# Patient Record
Sex: FEMALE | Race: WHITE | Employment: OTHER | URBAN - METROPOLITAN AREA
[De-identification: names, ages, dates, MRNs, and addresses within clinical notes are randomized per-mention and may not be internally consistent; named-entity substitution may affect disease eponyms.]

---

## 2017-01-16 ENCOUNTER — HOSPITAL ENCOUNTER (OUTPATIENT)
Dept: OTHER | Age: 72
Discharge: HOME | End: 2017-01-16
Attending: INTERNAL MEDICINE | Admitting: INTERNAL MEDICINE

## 2017-01-16 DIAGNOSIS — M70.72 BURSITIS OF HIP, LEFT: ICD-10-CM

## 2018-12-08 ENCOUNTER — HOSPITAL ENCOUNTER (EMERGENCY)
Age: 73
Discharge: HOME | End: 2018-12-08
Attending: EMERGENCY MEDICINE
Payer: MEDICARE

## 2018-12-08 ENCOUNTER — APPOINTMENT (OUTPATIENT)
Dept: GENERAL RADIOLOGY | Age: 73
End: 2018-12-08
Payer: MEDICARE

## 2018-12-08 VITALS
RESPIRATION RATE: 19 BRPM | HEIGHT: 66 IN | WEIGHT: 165 LBS | HEART RATE: 51 BPM | BODY MASS INDEX: 26.52 KG/M2 | OXYGEN SATURATION: 96 % | SYSTOLIC BLOOD PRESSURE: 154 MMHG | TEMPERATURE: 98.4 F | DIASTOLIC BLOOD PRESSURE: 93 MMHG

## 2018-12-08 DIAGNOSIS — M62.838 SPASM OF MUSCLE: Primary | ICD-10-CM

## 2018-12-08 PROCEDURE — 73030 X-RAY EXAM OF SHOULDER: CPT

## 2018-12-08 PROCEDURE — 96372 THER/PROPH/DIAG INJ SC/IM: CPT

## 2018-12-08 PROCEDURE — 6370000000 HC RX 637 (ALT 250 FOR IP): Performed by: EMERGENCY MEDICINE

## 2018-12-08 PROCEDURE — 71046 X-RAY EXAM CHEST 2 VIEWS: CPT

## 2018-12-08 PROCEDURE — 99283 EMERGENCY DEPT VISIT LOW MDM: CPT

## 2018-12-08 PROCEDURE — 6360000002 HC RX W HCPCS: Performed by: NURSE PRACTITIONER

## 2018-12-08 RX ORDER — CYCLOBENZAPRINE HCL 10 MG
10 TABLET ORAL NIGHTLY PRN
Qty: 30 TABLET | Refills: 0 | Status: SHIPPED | OUTPATIENT
Start: 2018-12-08 | End: 2018-12-18

## 2018-12-08 RX ORDER — KETOROLAC TROMETHAMINE 30 MG/ML
15 INJECTION, SOLUTION INTRAMUSCULAR; INTRAVENOUS ONCE
Status: COMPLETED | OUTPATIENT
Start: 2018-12-08 | End: 2018-12-08

## 2018-12-08 RX ORDER — HYDROCODONE BITARTRATE AND ACETAMINOPHEN 5; 325 MG/1; MG/1
1 TABLET ORAL ONCE
Status: COMPLETED | OUTPATIENT
Start: 2018-12-08 | End: 2018-12-08

## 2018-12-08 RX ORDER — CYCLOBENZAPRINE HCL 10 MG
10 TABLET ORAL ONCE
Status: COMPLETED | OUTPATIENT
Start: 2018-12-08 | End: 2018-12-08

## 2018-12-08 RX ADMIN — KETOROLAC TROMETHAMINE 15 MG: 30 INJECTION, SOLUTION INTRAMUSCULAR at 19:56

## 2018-12-08 RX ADMIN — CYCLOBENZAPRINE HYDROCHLORIDE 10 MG: 10 TABLET, FILM COATED ORAL at 20:59

## 2018-12-08 RX ADMIN — HYDROCODONE BITARTRATE AND ACETAMINOPHEN 1 TABLET: 5; 325 TABLET ORAL at 22:09

## 2018-12-08 ASSESSMENT — PAIN SCALES - GENERAL
PAINLEVEL_OUTOF10: 8
PAINLEVEL_OUTOF10: 8

## 2018-12-08 ASSESSMENT — PAIN DESCRIPTION - DESCRIPTORS
DESCRIPTORS: ACHING;CONSTANT
DESCRIPTORS: ACHING

## 2018-12-08 ASSESSMENT — PAIN DESCRIPTION - PAIN TYPE
TYPE: ACUTE PAIN;CHRONIC PAIN
TYPE: ACUTE PAIN;CHRONIC PAIN

## 2018-12-08 ASSESSMENT — PAIN DESCRIPTION - LOCATION
LOCATION: ARM;NECK
LOCATION: ARM;NECK

## 2018-12-08 ASSESSMENT — PAIN DESCRIPTION - ORIENTATION
ORIENTATION: LEFT
ORIENTATION: LEFT

## 2018-12-09 NOTE — ED NOTES
PT given Hydrocodone before going to help get the edge off of her pain per daughter's request. Pt taken via wheelchair to car. Pt safely in car with all belongings. Pt and pt's daughter have no further questions or concerns at this time. Pt in no signs of distress. Dc instructions verbalized understanding.       Raynelle Paget, RN  12/08/18 5146

## 2018-12-09 NOTE — ED NOTES
Checked to see how pt is doing, pt's daughter stated she is worried about her mom's BP at 290 and that her mom is still in pain. MD aware and will go talk with pt and pt's mom.       Maeve Malave RN  12/08/18 1095

## 2019-01-18 ENCOUNTER — HOSPITAL ENCOUNTER (OUTPATIENT)
Dept: GENERAL RADIOLOGY | Age: 74
Discharge: HOME | End: 2019-01-18
Payer: MEDICARE

## 2019-01-18 ENCOUNTER — HOSPITAL ENCOUNTER (OUTPATIENT)
Dept: MRI IMAGING | Age: 74
Discharge: HOME | End: 2019-01-18
Payer: MEDICARE

## 2019-01-18 DIAGNOSIS — M54.12 CERVICAL RADICULOPATHY: ICD-10-CM

## 2019-01-18 PROCEDURE — 72141 MRI NECK SPINE W/O DYE: CPT

## 2019-01-18 PROCEDURE — 72050 X-RAY EXAM NECK SPINE 4/5VWS: CPT

## 2019-04-01 ENCOUNTER — HOSPITAL ENCOUNTER (OUTPATIENT)
Age: 74
Discharge: HOME | End: 2019-04-01
Payer: MEDICARE

## 2019-04-01 ENCOUNTER — HOSPITAL ENCOUNTER (OUTPATIENT)
Dept: GENERAL RADIOLOGY | Age: 74
Discharge: HOME | End: 2019-04-01
Payer: MEDICARE

## 2019-04-01 DIAGNOSIS — R52 PAIN: ICD-10-CM

## 2019-04-01 PROCEDURE — 73502 X-RAY EXAM HIP UNI 2-3 VIEWS: CPT

## 2020-07-24 ENCOUNTER — APPOINTMENT (OUTPATIENT)
Dept: GENERAL RADIOLOGY | Age: 75
End: 2020-07-24
Payer: MEDICARE

## 2020-07-24 ENCOUNTER — HOSPITAL ENCOUNTER (EMERGENCY)
Age: 75
Discharge: HOME | End: 2020-07-25
Attending: EMERGENCY MEDICINE
Payer: MEDICARE

## 2020-07-24 VITALS
SYSTOLIC BLOOD PRESSURE: 177 MMHG | BODY MASS INDEX: 26.63 KG/M2 | TEMPERATURE: 97.7 F | OXYGEN SATURATION: 95 % | HEART RATE: 63 BPM | WEIGHT: 165 LBS | DIASTOLIC BLOOD PRESSURE: 85 MMHG | RESPIRATION RATE: 18 BRPM

## 2020-07-24 PROCEDURE — 6370000000 HC RX 637 (ALT 250 FOR IP): Performed by: PHYSICIAN ASSISTANT

## 2020-07-24 PROCEDURE — 73030 X-RAY EXAM OF SHOULDER: CPT

## 2020-07-24 PROCEDURE — 99283 EMERGENCY DEPT VISIT LOW MDM: CPT

## 2020-07-24 RX ORDER — HYDROCODONE BITARTRATE AND ACETAMINOPHEN 5; 325 MG/1; MG/1
1 TABLET ORAL ONCE
Status: COMPLETED | OUTPATIENT
Start: 2020-07-24 | End: 2020-07-24

## 2020-07-24 RX ORDER — HYDROCODONE BITARTRATE AND ACETAMINOPHEN 5; 325 MG/1; MG/1
1 TABLET ORAL EVERY 6 HOURS PRN
Qty: 10 TABLET | Refills: 0 | Status: SHIPPED | OUTPATIENT
Start: 2020-07-24 | End: 2020-07-27

## 2020-07-24 RX ADMIN — HYDROCODONE BITARTRATE AND ACETAMINOPHEN 1 TABLET: 5; 325 TABLET ORAL at 23:02

## 2020-07-24 ASSESSMENT — PAIN SCALES - GENERAL
PAINLEVEL_OUTOF10: 10
PAINLEVEL_OUTOF10: 10

## 2020-07-24 ASSESSMENT — PAIN DESCRIPTION - ORIENTATION: ORIENTATION: LEFT

## 2020-07-24 ASSESSMENT — PAIN DESCRIPTION - LOCATION: LOCATION: SHOULDER

## 2020-07-24 ASSESSMENT — PAIN DESCRIPTION - PAIN TYPE: TYPE: ACUTE PAIN;CHRONIC PAIN

## 2020-07-24 ASSESSMENT — PAIN DESCRIPTION - DESCRIPTORS: DESCRIPTORS: ACHING

## 2020-07-24 ASSESSMENT — PAIN DESCRIPTION - FREQUENCY: FREQUENCY: CONTINUOUS

## 2020-07-25 PROCEDURE — 6370000000 HC RX 637 (ALT 250 FOR IP): Performed by: PHYSICIAN ASSISTANT

## 2020-07-25 RX ORDER — LIDOCAINE 4 G/G
1 PATCH TOPICAL ONCE
Status: DISCONTINUED | OUTPATIENT
Start: 2020-07-25 | End: 2020-07-25 | Stop reason: HOSPADM

## 2020-07-25 RX ORDER — HYDROCODONE BITARTRATE AND ACETAMINOPHEN 5; 325 MG/1; MG/1
1 TABLET ORAL ONCE
Status: COMPLETED | OUTPATIENT
Start: 2020-07-25 | End: 2020-07-25

## 2020-07-25 RX ADMIN — HYDROCODONE BITARTRATE AND ACETAMINOPHEN 1 TABLET: 5; 325 TABLET ORAL at 00:20

## 2020-07-25 ASSESSMENT — ENCOUNTER SYMPTOMS
RHINORRHEA: 0
NAUSEA: 0
SORE THROAT: 0
VOMITING: 0
COLOR CHANGE: 0
ABDOMINAL PAIN: 0
SHORTNESS OF BREATH: 0
COUGH: 0

## 2020-07-25 NOTE — ED PROVIDER NOTES
gastritis, DVT (deep venous thrombosis) ([facility] [address] Hearing impairment, History of degenerative disc disease, HLD (hyperlipidemia), Hypothyroid, Peripheral neuropathy, and Pulmonary embolism ([facility] [address] She has no past surgical history on file. Her family history is not on file. She reports that she has never smoked. She has never used smokeless tobacco. She reports that she does not drink alcohol or use drugs. Medications     Previous Medications    CALCIUM CITRATE-VITAMIN D 200-200 MG-UNIT TABS    Take 1 tablet by mouth 2 times daily. CITALOPRAM (CELEXA) 20 MG TABLET    Take 20 mg by mouth daily. GABAPENTIN (NEURONTIN) 300 MG CAPSULE    Take 300 mg by mouth 3 times daily. GLUCOSAMINE-CHONDROITIN 500-400 MG CAPS    Take 1 capsule by mouth 2 times daily     HYDROCHLOROTHIAZIDE (MICROZIDE) 12.5 MG CAPSULE    Take 12.5 mg by mouth every morning    LEVOTHYROXINE (SYNTHROID) 50 MCG TABLET    TAKE ONE TABLET BY MOUTH ONE-HALF HOUR BEFORE BREAKFAST WITH A FULL GLASS OF WATER    MULTIPLE VITAMIN (MULTIVITAMIN) CAPSULE    Take 1 capsule by mouth daily. PRAVACHOL 10 MG TABLET    TAKE ONE TABLET BY MOUTH EVERY DAY    WARFARIN (COUMADIN) 5 MG TABLET    Take 7.5 mg by mouth daily        Allergies     She is allergic to penicillins and heparin. Physical Exam     INITIAL VITALS: BP: (!) 177/85, Temp: 97.7 °F (36.5 °C), Pulse: 63, Resp: 18, SpO2: 95 %  Physical Exam  Vitals signs reviewed. Constitutional:       General: She is not in acute distress. Appearance: Normal appearance. She is well-developed and normal weight. She is not ill-appearing. HENT:      Head: Normocephalic and atraumatic. Mouth/Throat:      Mouth: Mucous membranes are moist.   Eyes:      Extraocular Movements: Extraocular movements intact. Conjunctiva/sclera: Conjunctivae normal.   Neck:      Musculoskeletal: Normal range of motion and neck supple.       Trachea: Phonation normal.   Cardiovascular:      Rate and Rhythm: Normal due to persistent pain. Her daughter accompanies her and will be staying with her tonight. X-ray of the left shoulder shows no acute osseous injury or dislocation. The patient was reassured. She may have suffered a rotator cuff injury. She is placed in a sling and instructed on pendulum exercises. A lidocaine patch was applied and she is given a small prescription for Norco to take as directed for severe pain. She will follow-up with Dr. Tito Fenton as scheduled in 3 days for additional management or return to the ED for any worsening symptoms. This patient was also evaluated by the attending physician. All care plans were discussed and agreed upon. Clinical Impression     1. Acute pain of left shoulder        Disposition     PATIENT REFERRED TO:  Deni Hawkins MD  [address] [address]     In 3 days  as scheduled      DISCHARGE MEDICATIONS:  Discharge Medication List as of 7/24/2020 11:48 PM      START taking these medications    Details   HYDROcodone-acetaminophen (NORCO) 5-325 MG per tablet Take 1 tablet by mouth every 6 hours as needed for Pain for up to 3 days. Do not drive or operate machinery with this. Do not take additional Tylenol with this. May cause constipation. , Disp-10 tablet,R-0Print             DISPOSITION  Discharged     [place], [place]  07/25/20 5593

## 2020-07-25 NOTE — ED NOTES
Pt from home with daughter with c/o increased left shoulder pain. Hx of left shoulder issues that she receives injections for at [address] [place] [address]. Had a fall at the begining of July, no recent injury, but daughter is worried that she may have re injured the shoulder. No obvious deformity noted. Pt able to move arm but with difficulty due to pain. Has not taken anything for pain today.       Mario Avila, RN  07/24/20 [id number] [address], RN  07/24/20 9962

## 2021-01-31 ENCOUNTER — APPOINTMENT (OUTPATIENT)
Dept: GENERAL RADIOLOGY | Age: 76
End: 2021-01-31
Payer: COMMERCIAL

## 2021-01-31 ENCOUNTER — HOSPITAL ENCOUNTER (EMERGENCY)
Age: 76
Discharge: HOME | End: 2021-01-31
Attending: EMERGENCY MEDICINE
Payer: COMMERCIAL

## 2021-01-31 VITALS
RESPIRATION RATE: 20 BRPM | SYSTOLIC BLOOD PRESSURE: 183 MMHG | DIASTOLIC BLOOD PRESSURE: 85 MMHG | OXYGEN SATURATION: 91 % | TEMPERATURE: 99 F | HEART RATE: 86 BPM

## 2021-01-31 DIAGNOSIS — S62.661A CLOSED NONDISPLACED FRACTURE OF DISTAL PHALANX OF LEFT INDEX FINGER, INITIAL ENCOUNTER: ICD-10-CM

## 2021-01-31 DIAGNOSIS — S61.211A LACERATION OF LEFT INDEX FINGER WITHOUT FOREIGN BODY WITHOUT DAMAGE TO NAIL, INITIAL ENCOUNTER: Primary | ICD-10-CM

## 2021-01-31 PROCEDURE — 12001 RPR S/N/AX/GEN/TRNK 2.5CM/<: CPT

## 2021-01-31 PROCEDURE — 90715 TDAP VACCINE 7 YRS/> IM: CPT | Performed by: PHYSICIAN ASSISTANT

## 2021-01-31 PROCEDURE — 6360000002 HC RX W HCPCS: Performed by: PHYSICIAN ASSISTANT

## 2021-01-31 PROCEDURE — 73140 X-RAY EXAM OF FINGER(S): CPT

## 2021-01-31 PROCEDURE — 99285 EMERGENCY DEPT VISIT HI MDM: CPT

## 2021-01-31 PROCEDURE — 2500000003 HC RX 250 WO HCPCS: Performed by: PHYSICIAN ASSISTANT

## 2021-01-31 PROCEDURE — 6370000000 HC RX 637 (ALT 250 FOR IP): Performed by: PHYSICIAN ASSISTANT

## 2021-01-31 PROCEDURE — 90471 IMMUNIZATION ADMIN: CPT | Performed by: PHYSICIAN ASSISTANT

## 2021-01-31 RX ORDER — CEPHALEXIN 500 MG/1
500 CAPSULE ORAL 2 TIMES DAILY
Qty: 10 CAPSULE | Refills: 0 | Status: SHIPPED | OUTPATIENT
Start: 2021-01-31 | End: 2021-02-05

## 2021-01-31 RX ORDER — CEPHALEXIN 500 MG/1
500 CAPSULE ORAL ONCE
Status: COMPLETED | OUTPATIENT
Start: 2021-01-31 | End: 2021-01-31

## 2021-01-31 RX ORDER — TRAMADOL HYDROCHLORIDE 50 MG/1
50 TABLET ORAL EVERY 8 HOURS PRN
Qty: 6 TABLET | Refills: 0 | Status: SHIPPED | OUTPATIENT
Start: 2021-01-31 | End: 2021-02-03

## 2021-01-31 RX ORDER — BACITRACIN ZINC AND POLYMYXIN B SULFATE 500; 1000 [USP'U]/G; [USP'U]/G
OINTMENT TOPICAL ONCE
Status: COMPLETED | OUTPATIENT
Start: 2021-01-31 | End: 2021-01-31

## 2021-01-31 RX ORDER — LIDOCAINE HYDROCHLORIDE 20 MG/ML
5 INJECTION, SOLUTION INFILTRATION; PERINEURAL ONCE
Status: COMPLETED | OUTPATIENT
Start: 2021-01-31 | End: 2021-01-31

## 2021-01-31 RX ADMIN — CEPHALEXIN 500 MG: 500 CAPSULE ORAL at 21:06

## 2021-01-31 RX ADMIN — BACITRACIN ZINC AND POLYMYXIN B SULFATE: 500; 10000 OINTMENT TOPICAL at 21:01

## 2021-01-31 RX ADMIN — LIDOCAINE HYDROCHLORIDE 5 ML: 20 INJECTION, SOLUTION INFILTRATION; PERINEURAL at 19:08

## 2021-01-31 RX ADMIN — TETANUS TOXOID, REDUCED DIPHTHERIA TOXOID AND ACELLULAR PERTUSSIS VACCINE, ADSORBED 0.5 ML: 5; 2.5; 8; 8; 2.5 SUSPENSION INTRAMUSCULAR at 19:00

## 2021-01-31 ASSESSMENT — PAIN DESCRIPTION - LOCATION: LOCATION: FINGER (COMMENT WHICH ONE)

## 2021-01-31 ASSESSMENT — PAIN SCALES - GENERAL: PAINLEVEL_OUTOF10: 7

## 2021-01-31 ASSESSMENT — PAIN DESCRIPTION - ORIENTATION: ORIENTATION: LEFT

## 2021-01-31 ASSESSMENT — PAIN DESCRIPTION - DESCRIPTORS: DESCRIPTORS: CONSTANT;THROBBING

## 2021-01-31 NOTE — ED NOTES
Pt to ed for c/o left index finger injury. Pt finger smashed in car door.      Nicole Soria RN  01/31/21 6313

## 2021-01-31 NOTE — ED NOTES
Bed: B16-16  Expected date:   Expected time:   Means of arrival:   Comments:  Next [place], RN  01/31/21 2611

## 2021-01-31 NOTE — ED PROVIDER NOTES
[address] [facility] ENCOUNTER          PHYSICIAN ASSISTANT NOTE       Date of evaluation: 1/31/2021    Chief Complaint     Finger Injury (Left index finger injury from shutting it in a car door. Laceration and bleeding)      History of Present Illness     Shantal Moe is a 70 y.o. female with past medical history significant for hearing impairment, hyperlipidemia, hypothyroidism, PE/DVT on Coumadin who presents with complaints of left index finger injury. Patient states that her  was driving and when she went to get out of the car she was resting her left hand against the car and slammed the car door with her right hand. States that she accidentally slammed her left index finger into the car door. Patient is left-handed. Unsure of tetanus status. States she has pain only to left index finger. Denies decreased sensation or decreased range of motion, but does endorse pain with range of motion. Denies any other injury. Review of Systems     Review of Systems   See HPI, all others negative. Past Medical, Surgical, Family, and Social History     She has a past medical history of Carpal tunnel syndrome, Chronic gastritis, DVT (deep venous thrombosis) ([address] Hearing impairment, History of degenerative disc disease, HLD (hyperlipidemia), Hypothyroid, Peripheral neuropathy, and Pulmonary embolism ([facility] [address] She has no past surgical history on file. Her family history is not on file. She reports that she has never smoked. She has never used smokeless tobacco. She reports that she does not drink alcohol or use drugs. Medications     Previous Medications    CALCIUM CITRATE-VITAMIN D 200-200 MG-UNIT TABS    Take 1 tablet by mouth 2 times daily. CITALOPRAM (CELEXA) 20 MG TABLET    Take 20 mg by mouth daily. GABAPENTIN (NEURONTIN) 300 MG CAPSULE    Take 300 mg by mouth 3 times daily.      GLUCOSAMINE-CHONDROITIN 500-400 MG CAPS    Take 1 capsule by mouth 2 times daily HYDROCHLOROTHIAZIDE (MICROZIDE) 12.5 MG CAPSULE    Take 12.5 mg by mouth every morning    LEVOTHYROXINE (SYNTHROID) 50 MCG TABLET    TAKE ONE TABLET BY MOUTH ONE-HALF HOUR BEFORE BREAKFAST WITH A FULL GLASS OF WATER    MULTIPLE VITAMIN (MULTIVITAMIN) CAPSULE    Take 1 capsule by mouth daily. PRAVACHOL 10 MG TABLET    TAKE ONE TABLET BY MOUTH EVERY DAY    WARFARIN (COUMADIN) 5 MG TABLET    Take 7.5 mg by mouth daily        Allergies     She is allergic to penicillins and heparin. Physical Exam     INITIAL VITALS: BP: (!) 183/85, Temp: 99 °F (37.2 °C), Pulse: 86, Resp: 20, SpO2: 91 %  Physical Exam  Constitutional:       General: She is not in acute distress. Appearance: Normal appearance. She is not ill-appearing, toxic-appearing or diaphoretic. Comments: Hearing impaired at baseline. HENT:      Head: Normocephalic and atraumatic. Nose: Nose normal.      Mouth/Throat:      Mouth: Mucous membranes are moist.   Eyes:      Conjunctiva/sclera: Conjunctivae normal.   Neck:      Musculoskeletal: Normal range of motion. Cardiovascular:      Rate and Rhythm: Normal rate. Pulmonary:      Effort: Pulmonary effort is normal.   Abdominal:      General: There is no distension. Palpations: Abdomen is soft. Musculoskeletal:      Comments: 2 cm laceration noted to medial and dorsal aspect of left index finger with 0.5 cm laceration noted to opposite side of digit. Mild active bleeding present. Sensation intact throughout. Able to flex and extend at MCP, PIP, and DIP against resistance. Skin:     General: Skin is warm and dry. Neurological:      Mental Status: She is alert. Diagnostic Results     RADIOLOGY:  XR FINGER LEFT (MIN 2 VIEWS)   Final Result   1. Fracture the base of the distal phalanx of the second digit   2. Osteoarthritis of the proximal interphalangeal joint. LABS:   No results found for this visit on 01/31/21. RECENT VITALS:  BP: (!) 183/85, Temp: 99 °F (37.2 °C), Pulse: 86, Resp: 20, SpO2: 91 %     Procedures     Lac Repair    Date/Time: 1/31/2021 8:38 PM  Performed by: Amber Sanon PA-C  Authorized by: Audrey Gabriel MD     Consent:     Consent obtained:  Verbal    Consent given by:  Patient    Risks discussed:  Infection and pain    Alternatives discussed:  No treatment, delayed treatment, observation and referral  Anesthesia (see MAR for exact dosages): Anesthesia method:  Nerve block    Block location:  L index digital block    Block needle gauge:  25 G    Block anesthetic:  Lidocaine 2% w/o epi    Block injection procedure:  Anatomic landmarks identified, anatomic landmarks palpated, introduced needle, incremental injection and negative aspiration for blood    Block outcome:  Anesthesia achieved  Laceration details:     Location:  Finger    Finger location:  L index finger    Length (cm):  2  Repair type:     Repair type:  Simple  Pre-procedure details:     Preparation:  Patient was prepped and draped in usual sterile fashion  Treatment:     Area cleansed with:  Shur-Clens, soap and water and saline    Amount of cleaning:  Standard    Irrigation solution:  Sterile water    Irrigation method:  Syringe  Skin repair:     Repair method:  Sutures    Suture size:  5-0    Suture material:  Prolene    Suture technique:  Simple interrupted    Number of sutures:  8 (7 to larger lac, 1 to small lac on opposite side of distal portion of finger)  Approximation:     Approximation:  Close  Post-procedure details:     Dressing:  Antibiotic ointment, splint for protection and non-adherent dressing    Patient tolerance of procedure: Tolerated well, no immediate complications          ED Course     Nursing Notes, Past Medical Hx,Past Surgical Hx, Social Hx, Allergies, and Family Hx were reviewed.     The patient was given the following medications:  Orders Placed This Encounter   Medications  Tetanus-Diphth-Acell Pertussis (BOOSTRIX) injection 0.5 mL    lidocaine 2 % injection 5 mL    bacitracin-polymyxin b (POLYSPORIN) ointment    cephALEXin (KEFLEX) capsule 500 mg     Order Specific Question:   Antimicrobial Indications     Answer:   Surgical Prophylaxis    cephALEXin (KEFLEX) 500 MG capsule     Sig: Take 1 capsule by mouth 2 times daily for 5 days     Dispense:  10 capsule     Refill:  0    traMADol (ULTRAM) 50 MG tablet     Sig: Take 1 tablet by mouth every 8 hours as needed for Pain for up to 3 days. Intended supply: 3 days. Take lowest dose possible to manage pain     Dispense:  6 tablet     Refill:  0       CONSULTS:  None    MEDICAL DECISION MAKING / ASSESSMENT / PLAN     Gauri Schmid is a 70 y.o. female presenting with complaints of left index finger injury. Of note, patient states that she is left-handed, but daughter states that patient is right-handed. Patient unsure of tetanus status, so tetanus was updated today. X-ray shows:    1. Fracture the base of the distal phalanx of the second digit   2. Osteoarthritis of the proximal interphalangeal joint. Patient is neurovascularly intact to entire digit. Digital block was performed as described in detail above. Patient was then able to flex and extend at each joint against resistance. Evaluated laceration throughout full range of motion and no obvious osseous or tendon involvement, or involvement of joint space, so laceration was repaired primarily as described in detail above. Splint was placed and patient was given first dose of Keflex in the emergency department to prevent infection. Patient was counseled on wound care, when to have sutures removed, signs and symptoms of infection. Patient was given a prescription for Keflex to take twice daily for the next 5 days. Patient was also given a referral for Dr. Marychuy Le for follow-up as an outpatient. Spoke with patient's daughter regarding plan who was agreeable with this. Encouraged patient to take Tylenol for pain, patient given a short prescription for tramadol to take as needed for breakthrough pain, but discussed with patient and daughter that this causes drowsiness so she should not drive or operate machinery after taking. Patient was agreeable with this plan and was discharged in stable condition. This patient was also evaluated by the attending physician. All care plans were discussed and agreed upon. Clinical Impression     1. Laceration of left index finger without foreign body without damage to nail, initial encounter    2.  Closed nondisplaced fracture of distal phalanx of left index finger, initial encounter        Disposition     PATIENT REFERRED TO:  Margot Shepherd MD  [facility] [address] [facility]    Schedule an appointment as soon as possible for a visit         DISCHARGE MEDICATIONS:  New Prescriptions    CEPHALEXIN (KEFLEX) 500 MG CAPSULE    Take 1 capsule by mouth 2 times daily for 5 days TRAMADOL (ULTRAM) 50 MG TABLET    Take 1 tablet by mouth every 8 hours as needed for Pain for up to 3 days. Intended supply: 3 days.  Take lowest dose possible to manage pain       DISPOSITION          Danyelle Araujo PA-C  01/31/21 1582

## 2021-02-01 NOTE — ED PROVIDER NOTES
ED Attending Attestation Note     Date of evaluation: 1/31/2021    This patient was seen by the advance practice provider. I have seen and examined the patient, agree with the workup, evaluation, management and diagnosis. The care plan has been discussed. My assessment reveals a 70year old female with a past medical history of sciatica, DVT/PE on warfarin, and deaf who presents with a left 2nd digit laceration after it was inadvertently shut in a car door. She is right hand dominant. On my evaluation she has a laceration to the lateral aspect of her left 2nd digit around the DIP joint. Tetanus was updated. I was present for key portions of the laceration repair performed by the SHANTA.      Claudia Schmitz MD  01/31/21 2033

## 2021-02-14 ENCOUNTER — APPOINTMENT (OUTPATIENT)
Dept: CT IMAGING | Age: 76
End: 2021-02-14
Payer: COMMERCIAL

## 2021-02-14 ENCOUNTER — HOSPITAL ENCOUNTER (EMERGENCY)
Age: 76
Discharge: HOME | End: 2021-02-14
Attending: EMERGENCY MEDICINE
Payer: COMMERCIAL

## 2021-02-14 ENCOUNTER — APPOINTMENT (OUTPATIENT)
Dept: GENERAL RADIOLOGY | Age: 76
End: 2021-02-14
Payer: COMMERCIAL

## 2021-02-14 VITALS
DIASTOLIC BLOOD PRESSURE: 80 MMHG | HEIGHT: 64 IN | TEMPERATURE: 97.6 F | WEIGHT: 165 LBS | SYSTOLIC BLOOD PRESSURE: 170 MMHG | RESPIRATION RATE: 18 BRPM | OXYGEN SATURATION: 93 % | BODY MASS INDEX: 28.17 KG/M2 | HEART RATE: 60 BPM

## 2021-02-14 DIAGNOSIS — R10.9 ABDOMINAL PAIN, UNSPECIFIED ABDOMINAL LOCATION: Primary | ICD-10-CM

## 2021-02-14 LAB
ALBUMIN SERPL-MCNC: 3.7 G/DL (ref 3.4–5)
ALP BLD-CCNC: 97 U/L (ref 40–129)
ALT SERPL-CCNC: 15 U/L (ref 10–40)
ANION GAP SERPL CALCULATED.3IONS-SCNC: 11 MMOL/L (ref 3–16)
AST SERPL-CCNC: 24 U/L (ref 15–37)
BACTERIA: ABNORMAL /HPF
BASOPHILS ABSOLUTE: 0 K/UL (ref 0–0.2)
BASOPHILS RELATIVE PERCENT: 0.2 %
BILIRUB SERPL-MCNC: 0.4 MG/DL (ref 0–1)
BILIRUBIN DIRECT: <0.2 MG/DL (ref 0–0.3)
BILIRUBIN URINE: NEGATIVE
BILIRUBIN, INDIRECT: NORMAL MG/DL (ref 0–1)
BLOOD, URINE: NEGATIVE
BUN BLDV-MCNC: 19 MG/DL (ref 7–20)
CALCIUM SERPL-MCNC: 9 MG/DL (ref 8.3–10.6)
CHLORIDE BLD-SCNC: 101 MMOL/L (ref 99–110)
CLARITY: CLEAR
CO2: 28 MMOL/L (ref 21–32)
COLOR: YELLOW
CREAT SERPL-MCNC: 0.8 MG/DL (ref 0.6–1.2)
EOSINOPHILS ABSOLUTE: 0 K/UL (ref 0–0.6)
EOSINOPHILS RELATIVE PERCENT: 0.9 %
EPITHELIAL CELLS, UA: ABNORMAL /HPF (ref 0–5)
GFR AFRICAN AMERICAN: >60
GFR NON-AFRICAN AMERICAN: >60
GLUCOSE BLD-MCNC: 131 MG/DL (ref 70–99)
GLUCOSE URINE: NEGATIVE MG/DL
HCT VFR BLD CALC: 41 % (ref 36–48)
HEMOGLOBIN: 13.2 G/DL (ref 12–16)
KETONES, URINE: ABNORMAL MG/DL
LEUKOCYTE ESTERASE, URINE: NEGATIVE
LIPASE: 37 U/L (ref 13–60)
LYMPHOCYTES ABSOLUTE: 0.7 K/UL (ref 1–5.1)
LYMPHOCYTES RELATIVE PERCENT: 13.9 %
MCH RBC QN AUTO: 26.3 PG (ref 26–34)
MCHC RBC AUTO-ENTMCNC: 32.3 G/DL (ref 31–36)
MCV RBC AUTO: 81.6 FL (ref 80–100)
MICROSCOPIC EXAMINATION: YES
MONOCYTES ABSOLUTE: 0.4 K/UL (ref 0–1.3)
MONOCYTES RELATIVE PERCENT: 8.5 %
NEUTROPHILS ABSOLUTE: 3.7 K/UL (ref 1.7–7.7)
NEUTROPHILS RELATIVE PERCENT: 76.5 %
NITRITE, URINE: NEGATIVE
PDW BLD-RTO: 14.7 % (ref 12.4–15.4)
PH UA: 6.5 (ref 5–8)
PLATELET # BLD: 162 K/UL (ref 135–450)
PMV BLD AUTO: 9.4 FL (ref 5–10.5)
POTASSIUM REFLEX MAGNESIUM: 3.9 MMOL/L (ref 3.5–5.1)
PROTEIN UA: 30 MG/DL
RBC # BLD: 5.03 M/UL (ref 4–5.2)
RBC UA: ABNORMAL /HPF (ref 0–4)
SODIUM BLD-SCNC: 140 MMOL/L (ref 136–145)
SPECIFIC GRAVITY UA: 1.02 (ref 1–1.03)
TOTAL PROTEIN: 7.1 G/DL (ref 6.4–8.2)
URINE TYPE: ABNORMAL
UROBILINOGEN, URINE: 1 E.U./DL
WBC # BLD: 4.8 K/UL (ref 4–11)
WBC UA: ABNORMAL /HPF (ref 0–5)

## 2021-02-14 PROCEDURE — 74018 RADEX ABDOMEN 1 VIEW: CPT

## 2021-02-14 PROCEDURE — 99283 EMERGENCY DEPT VISIT LOW MDM: CPT

## 2021-02-14 PROCEDURE — U0003 INFECTIOUS AGENT DETECTION BY NUCLEIC ACID (DNA OR RNA); SEVERE ACUTE RESPIRATORY SYNDROME CORONAVIRUS 2 (SARS-COV-2) (CORONAVIRUS DISEASE [COVID-19]), AMPLIFIED PROBE TECHNIQUE, MAKING USE OF HIGH THROUGHPUT TECHNOLOGIES AS DESCRIBED BY CMS-2020-01-R: HCPCS

## 2021-02-14 PROCEDURE — 81001 URINALYSIS AUTO W/SCOPE: CPT

## 2021-02-14 PROCEDURE — 83690 ASSAY OF LIPASE: CPT

## 2021-02-14 PROCEDURE — 80076 HEPATIC FUNCTION PANEL: CPT

## 2021-02-14 PROCEDURE — 80048 BASIC METABOLIC PNL TOTAL CA: CPT

## 2021-02-14 PROCEDURE — 6360000004 HC RX CONTRAST MEDICATION: Performed by: PHYSICIAN ASSISTANT

## 2021-02-14 PROCEDURE — 74177 CT ABD & PELVIS W/CONTRAST: CPT

## 2021-02-14 PROCEDURE — 85025 COMPLETE CBC W/AUTO DIFF WBC: CPT

## 2021-02-14 RX ORDER — POLYETHYLENE GLYCOL 3350 17 G/17G
17 POWDER, FOR SOLUTION ORAL DAILY
Qty: 1 BOTTLE | Refills: 0 | Status: SHIPPED | OUTPATIENT
Start: 2021-02-14 | End: 2021-02-21

## 2021-02-14 RX ADMIN — IOPAMIDOL 80 ML: 755 INJECTION, SOLUTION INTRAVENOUS at 12:13

## 2021-02-14 ASSESSMENT — ENCOUNTER SYMPTOMS
CONSTIPATION: 1
NAUSEA: 1
VOMITING: 0
ABDOMINAL PAIN: 1
CHEST TIGHTNESS: 0
SHORTNESS OF BREATH: 0
DIARRHEA: 1
BACK PAIN: 0

## 2021-02-14 NOTE — ED PROVIDER NOTES
ED Attending Attestation Note     Date of evaluation: 2/14/2021    This patient was seen by the advance practice provider, Christiano Mcallister PA-C. I have seen and examined the patient, agree with the workup, evaluation, management and diagnosis. The care plan has been discussed. This is a 70-year-old female who is deaf that has a history of pulmonary embolism and thyroid disease that presents today for evaluation of abdominal pain. History is obtained in speaking with the , as well as speaking to her daughter via the phone. Apparently, the patient had diarrhea for the last 3 days, it is nonbloody. She did try Imodium with some improvement in her symptoms. However, her daughter states that she called her earlier this morning stating that she was weak and vomiting and needed to go to the emergency department. There is been no fevers or chills. No known COVID-19 exposures. The daughter does relate that she has a history of squamous carcinoma and she recently got that removed in dermatology earlier this week. On exam, the patient is in no acute distress. Her skin is pale. Her pupils are equal reactive to light. Lung sounds are clear but diminished. Belly soft, nontender, without rebound or guarding. Toe exam performed by PA shows no gross blood, brown stool, no hemorrhoids. Work-up in the emergency department consisted of labs, urinalysis and a KUB. Labs are reassuring, normal liver function test, no leukocytosis, normal renal function. KUB without acute findings, specifically no obstructive gas pattern. This was followed with a CT scan of the abdomen pelvis that showed a questionable pneumonia with a distended gallbladder with no gallstones. At this point, the patient had a p.o. challenge and was successful in this. We did reach out to the patient's daughter to explain findings today. We will test the patient for COVID-19, quarantine precautions were discussed.     Please see TITO note for reassessments and final disposition.        Troy Ponce MD  02/14/21 1176

## 2021-02-14 NOTE — ED PROVIDER NOTES
[facility]          PHYSICIAN ASSISTANT NOTE       Date of evaluation: 2/14/2021    Chief Complaint     Abdominal Pain, Constipation (5 days of abdominal pain, consitpation, and nausea, fatigue, and sweats), and Nausea      History of Present Illness     Ava Murrell is a 70 y.o. female who presents to the emergency department with abdominal pain, nausea and intermittent episodes of diarrhea and constipation. The patient states is been ongoing for the last week and 1/2 to 2 weeks. She states that initially started as diarrhea however after being given medication by her daughter she now feels constipated. She denies emesis but has had nausea. She states she has had a slight decrease in her appetite. Denies fevers or chills. Denies chest pain or shortness of breath. Denies dysuria, hematuria, hematochezia or hematemesis. She denies any sick contacts but does live with her . She has not seen her primary care physician for this. Review of Systems     Review of Systems   Constitutional: Negative for chills and fever. HENT: Negative. Respiratory: Negative for chest tightness and shortness of breath. Cardiovascular: Negative. Negative for chest pain. Gastrointestinal: Positive for abdominal pain, constipation, diarrhea and nausea. Negative for vomiting. Genitourinary: Negative. Negative for difficulty urinating, dysuria, flank pain, frequency and hematuria. Musculoskeletal: Negative for back pain and neck pain. Skin: Negative. Neurological: Negative. Negative for dizziness, weakness, light-headedness and headaches. Psychiatric/Behavioral: Negative. All other systems reviewed and are negative.       Past Medical, Surgical, Family, and Social History     She has a past medical history of Carpal tunnel syndrome, Chronic gastritis, DVT (deep venous thrombosis) ([facility] [address] Hearing impairment, History of degenerative disc disease, HLD no wheezing, rales or rhonchi good air movement throughout  Abdominal:      General: Bowel sounds are normal.      Palpations: Abdomen is soft. Tenderness: There is no abdominal tenderness. There is no right CVA tenderness, left CVA tenderness, guarding or rebound. Comments: Examination abdomen is soft without rebound or guarding. No tenderness on my palpation. No flank pain or CVA tenderness. No peritoneal signs. Genitourinary:     Comments: On rectal examination there is no evidence of external or internal hemorrhoids. No stool burden noted. Musculoskeletal: Normal range of motion. Skin:     General: Skin is warm and dry. Neurological:      General: No focal deficit present. Mental Status: She is alert and oriented to person, place, and time. Psychiatric:         Mood and Affect: Mood normal.         Behavior: Behavior normal.         Thought Content: Thought content normal.         Judgment: Judgment normal.         Diagnostic Results         RADIOLOGY:  CT ABDOMEN PELVIS W IV CONTRAST Additional Contrast? None   Final Result      Patchy bilateral airspace disease in the lower lungs, consistent with infiltrates. Trace right effusion. Cardiomegaly. Incidental cholelithiasis. Incidental small 1 x 0.6 cm cystic focus in the tail of the pancreas, nonspecific. Short-term follow-up recommended. No other acute findings in the abdomen or pelvis. INCIDENTAL FINDINGS      XR ABDOMEN (KUB) (SINGLE AP VIEW)   Final Result      Nonspecific abdomen.           LABS:   Results for orders placed or performed during the hospital encounter of 02/14/21   CBC auto differential   Result Value Ref Range    WBC 4.8 4.0 - 11.0 K/uL    RBC 5.03 4.00 - 5.20 M/uL    Hemoglobin 13.2 12.0 - 16.0 g/dL    Hematocrit 41.0 36.0 - 48.0 %    MCV 81.6 80.0 - 100.0 fL    MCH 26.3 26.0 - 34.0 pg    MCHC 32.3 31.0 - 36.0 g/dL    RDW 14.7 12.4 - 15.4 %    Platelets 832 891 - 090 K/uL    MPV 9.4 5.0 - 10.5 fL    Neutrophils % 76.5 %    Lymphocytes % 13.9 %    Monocytes % 8.5 %    Eosinophils % 0.9 %    Basophils % 0.2 %    Neutrophils Absolute 3.7 1.7 - 7.7 K/uL    Lymphocytes Absolute 0.7 (L) 1.0 - 5.1 K/uL    Monocytes Absolute 0.4 0.0 - 1.3 K/uL    Eosinophils Absolute 0.0 0.0 - 0.6 K/uL    Basophils Absolute 0.0 0.0 - 0.2 K/uL   Basic Metabolic Panel w/ Reflex to MG   Result Value Ref Range    Sodium 140 136 - 145 mmol/L    Potassium reflex Magnesium 3.9 3.5 - 5.1 mmol/L    Chloride 101 99 - 110 mmol/L    CO2 28 21 - 32 mmol/L    Anion Gap 11 3 - 16    Glucose 131 (H) 70 - 99 mg/dL    BUN 19 7 - 20 mg/dL    CREATININE 0.8 0.6 - 1.2 mg/dL    GFR Non-African American >60 >60    GFR African American >60 >60    Calcium 9.0 8.3 - 10.6 mg/dL   Lipase   Result Value Ref Range    Lipase 37.0 13.0 - 60.0 U/L   Hepatic function panel (LFTs)   Result Value Ref Range    Total Protein 7.1 6.4 - 8.2 g/dL    Albumin 3.7 3.4 - 5.0 g/dL    Alkaline Phosphatase 97 40 - 129 U/L    ALT 15 10 - 40 U/L    AST 24 15 - 37 U/L    Total Bilirubin 0.4 0.0 - 1.0 mg/dL    Bilirubin, Direct <0.2 0.0 - 0.3 mg/dL    Bilirubin, Indirect see below 0.0 - 1.0 mg/dL   Urinalysis   Result Value Ref Range    Color, UA Yellow Straw/Yellow    Clarity, UA Clear Clear    Glucose, Ur Negative Negative mg/dL    Bilirubin Urine Negative Negative    Ketones, Urine TRACE (A) Negative mg/dL    Specific Gravity, UA 1.020 1.005 - 1.030    Blood, Urine Negative Negative    pH, UA 6.5 5.0 - 8.0    Protein, UA 30 (A) Negative mg/dL    Urobilinogen, Urine 1.0 <2.0 E.U./dL    Nitrite, Urine Negative Negative    Leukocyte Esterase, Urine Negative Negative    Microscopic Examination YES     Urine Type Voided    Microscopic Urinalysis   Result Value Ref Range    WBC, UA None seen 0 - 5 /HPF    RBC, UA None seen 0 - 4 /HPF    Epithelial Cells, UA 0-1 0 - 5 /HPF    Bacteria, UA Rare (A) None Seen /HPF           RECENT VITALS:  BP: (!) 171/79, Temp: 97.6 °F (36.4 °C), Pulse: 63, Resp: 16, SpO2: 90 %     Procedures         ED Course     Nursing Notes, Past Medical Hx,Past Surgical Hx, Social Hx, Allergies, and Family Hx were reviewed. The patient was given the following medications:  Orders Placed This Encounter   Medications    iopamidol (ISOVUE-370) 76 % injection 80 mL    polyethylene glycol (MIRALAX) 17 GM/SCOOP powder     Sig: Take 17 g by mouth daily for 7 days PRN constipation     Dispense:  1 Bottle     Refill:  0       CONSULTS:  None    MEDICAL DECISION MAKING / ASSESSMENT / PLAN     Starr Gagnon is a 70 y.o. female who presented to the emergency department with abdominal pain, nausea. On examination her abdomen is soft without peritoneal signs. She had IV established with labs drawn. Labs are unremarkable. No evidence of dehydration. KUB shows no findings concerning for obstruction no large stool burden. Given the patient's pain I did order a CT of the abdomen and pelvis with IV contrast.  CT shows cholelithiasis with no evidence of cholecystitis. She has patchy bilateral airspace disease in the lower lungs consistent with possible infiltrates and a right trace effusion. Incidentally she has a small cystic focus in the tail of the pancreas nonspecific. The result was discussed with the patient and she will follow-up with her primary care physician. I spoke with the patient and she denies cough, congestion, chest pain or shortness of breath therefore my suspicion that this is a bacterial pneumonia is low in this patient. Her daughter was concerned that she may have Covid and this test has been ordered and is pending. Given that the patient is not hypoxic, has an unremarkable work-up here I do feel she can be discharged.   I discussed with both the patient and the daughter that we would not prescribe antibiotics given the patient is asymptomatic however I encouraged that she follow-up with her primary care physician this week for reevaluation and should she develop findings concerning for pneumonia she may warrant antibiotics at that time. They both were in agreement to this plan. At this time she will be discharged home. She is to return immediately for worsening symptoms or concerns as discussed. This patient was also evaluated by the attending physician. All care plans were discussed and agreed upon. Clinical Impression     1.  Abdominal pain, unspecified abdominal location        Disposition     PATIENT REFERRED TO:  Laura Perdue MD  [facility], [address] [address] [facility] [address] [phone]    Call   for follow up and re-evaluation      DISCHARGE MEDICATIONS:  New Prescriptions    POLYETHYLENE GLYCOL (MIRALAX) 17 GM/SCOOP POWDER    Take 17 g by mouth daily for 7 days PRN constipation       DISPOSITION Decision To Discharge 02/14/2021 02:12:02 PM      ROSIBEL Gill  02/14/21 9036

## 2021-02-15 ENCOUNTER — CARE COORDINATION (OUTPATIENT)
Dept: CARE COORDINATION | Age: 76
End: 2021-02-15

## 2021-02-15 LAB — SARS-COV-2, PCR: DETECTED

## 2021-02-15 NOTE — CARE COORDINATION
Patient contacted regarding recent visit for viral symptoms. This Francheska Mosqueda contacted the family by telephone to perform post discharge call. Call within 2 business days of discharge: Yes    Left HIPAA compliant message for POA/Daughter, Ray Mills to return call. If no return call, will attempt outreach again.

## 2021-02-16 NOTE — CARE COORDINATION
Patient contacted regarding recent visit for viral symptoms. This Wale Murillo contacted the family by telephone to perform post discharge call. Verified name and  with family as identifiers. Provided introduction to self, and reason for call due to viral symptoms of infection and/or exposure to COVID-19. Call within 2 business days of discharge: Yes    Spoke to daughter/POADaniele who reports that symptoms have improved some. Daughter reports that new RX has been filled and has been taking. Daughter reports that pt frequently follows up with PCP, Dr. Tom Boone for INR management and will be following up with PCP soon. Daughter declined [place]. Daughter declined Loop enrollment and follow up call next week for symptom recheck, but did thank author for calling to follow up. Will remove self from care team.      Patient presented to emergency department/flu clinic with complaints of viral symptoms/exposure to COVID. Patient reports symptoms are improving. Due to no new or worsening symptoms the RN CTN/ACM was not notified for escalation. Discussed exposure protocols and quarantine with CDC Guidelines What To Do If You Are Sick    Family was given an opportunity for questions and concerns. Stay home except to get medical care    Separate yourself from other people and animals in your home    Call ahead before visiting your doctor    Wear a facemask    Cover your coughs and sneezes    Clean your hands often    Avoid sharing personal household items    Clean all high-touch surfaces everyday    Monitor your symptoms  Seek prompt medical attention if your illness is worsening (e.g., difficulty breathing). Before seeking care, call your healthcare provider and tell them that you have, or are being evaluated for, COVID-19. Put on a facemask before you enter the facility.  These steps will help the healthcare provider's office to keep other people in the office or waiting room from getting infected or exposed. Ask your healthcare provider to call the local or state health department. Persons who are placed under If you have a medical emergency and need to call 911, notify the dispatch personnel that you have, or are being evaluated for COVID-19. If possible, put on a facemask before emergency medical services arrive. The family agrees to contact the [employer] exposure line [phone], local health department [address]: ([phone]) and PCP office for questions related to their healthcare. Author provided contact information for future reference. Patient/family/caregiver given information for [employer] and agrees to enroll no, declined  Patient's preferred e-mail:    Patient's preferred phone number:   Based on Loop alert triggers, patient will be contacted by nurse care manager for worsening symptoms.

## 2021-06-26 ENCOUNTER — APPOINTMENT (OUTPATIENT)
Dept: GENERAL RADIOLOGY | Age: 76
End: 2021-06-26
Payer: COMMERCIAL

## 2021-06-26 ENCOUNTER — APPOINTMENT (OUTPATIENT)
Dept: CT IMAGING | Age: 76
End: 2021-06-26
Payer: COMMERCIAL

## 2021-06-26 ENCOUNTER — HOSPITAL ENCOUNTER (EMERGENCY)
Age: 76
Discharge: HOME | End: 2021-06-26
Attending: EMERGENCY MEDICINE
Payer: COMMERCIAL

## 2021-06-26 VITALS
TEMPERATURE: 98.7 F | WEIGHT: 180 LBS | RESPIRATION RATE: 18 BRPM | HEIGHT: 62 IN | SYSTOLIC BLOOD PRESSURE: 164 MMHG | OXYGEN SATURATION: 94 % | HEART RATE: 66 BPM | BODY MASS INDEX: 33.13 KG/M2 | DIASTOLIC BLOOD PRESSURE: 88 MMHG

## 2021-06-26 DIAGNOSIS — M54.6 ACUTE RIGHT-SIDED THORACIC BACK PAIN: Primary | ICD-10-CM

## 2021-06-26 LAB
ALBUMIN SERPL-MCNC: 4.2 G/DL (ref 3.4–5)
ALP BLD-CCNC: 67 U/L (ref 40–129)
ALT SERPL-CCNC: 17 U/L (ref 10–40)
ANION GAP SERPL CALCULATED.3IONS-SCNC: 10 MMOL/L (ref 3–16)
AST SERPL-CCNC: 25 U/L (ref 15–37)
BASOPHILS ABSOLUTE: 0 K/UL (ref 0–0.2)
BASOPHILS RELATIVE PERCENT: 0.7 %
BILIRUB SERPL-MCNC: 0.3 MG/DL (ref 0–1)
BILIRUBIN DIRECT: <0.2 MG/DL (ref 0–0.3)
BILIRUBIN URINE: NEGATIVE
BILIRUBIN, INDIRECT: NORMAL MG/DL (ref 0–1)
BLOOD, URINE: NEGATIVE
BUN BLDV-MCNC: 26 MG/DL (ref 7–20)
CALCIUM SERPL-MCNC: 9.5 MG/DL (ref 8.3–10.6)
CHLORIDE BLD-SCNC: 102 MMOL/L (ref 99–110)
CLARITY: CLEAR
CO2: 30 MMOL/L (ref 21–32)
COLOR: YELLOW
CREAT SERPL-MCNC: 1 MG/DL (ref 0.6–1.2)
EOSINOPHILS ABSOLUTE: 0.2 K/UL (ref 0–0.6)
EOSINOPHILS RELATIVE PERCENT: 2.6 %
GFR AFRICAN AMERICAN: >60
GFR NON-AFRICAN AMERICAN: 54
GLUCOSE BLD-MCNC: 120 MG/DL (ref 70–99)
GLUCOSE URINE: NEGATIVE MG/DL
HCT VFR BLD CALC: 40.1 % (ref 36–48)
HEMOGLOBIN: 13.3 G/DL (ref 12–16)
KETONES, URINE: NEGATIVE MG/DL
LEUKOCYTE ESTERASE, URINE: NEGATIVE
LIPASE: 33 U/L (ref 13–60)
LYMPHOCYTES ABSOLUTE: 1.4 K/UL (ref 1–5.1)
LYMPHOCYTES RELATIVE PERCENT: 23.9 %
MCH RBC QN AUTO: 27.9 PG (ref 26–34)
MCHC RBC AUTO-ENTMCNC: 33.3 G/DL (ref 31–36)
MCV RBC AUTO: 83.9 FL (ref 80–100)
MICROSCOPIC EXAMINATION: NORMAL
MONOCYTES ABSOLUTE: 0.4 K/UL (ref 0–1.3)
MONOCYTES RELATIVE PERCENT: 7.4 %
NEUTROPHILS ABSOLUTE: 3.8 K/UL (ref 1.7–7.7)
NEUTROPHILS RELATIVE PERCENT: 65.4 %
NITRITE, URINE: NEGATIVE
PDW BLD-RTO: 13.9 % (ref 12.4–15.4)
PH UA: 5.5 (ref 5–8)
PLATELET # BLD: 119 K/UL (ref 135–450)
PMV BLD AUTO: 9.7 FL (ref 5–10.5)
POTASSIUM REFLEX MAGNESIUM: 4.2 MMOL/L (ref 3.5–5.1)
PROTEIN UA: NEGATIVE MG/DL
RBC # BLD: 4.77 M/UL (ref 4–5.2)
SODIUM BLD-SCNC: 142 MMOL/L (ref 136–145)
SPECIFIC GRAVITY UA: >=1.03 (ref 1–1.03)
TOTAL PROTEIN: 6.6 G/DL (ref 6.4–8.2)
URINE TYPE: NORMAL
UROBILINOGEN, URINE: 0.2 E.U./DL
WBC # BLD: 5.8 K/UL (ref 4–11)

## 2021-06-26 PROCEDURE — 71275 CT ANGIOGRAPHY CHEST: CPT

## 2021-06-26 PROCEDURE — 71046 X-RAY EXAM CHEST 2 VIEWS: CPT

## 2021-06-26 PROCEDURE — 72100 X-RAY EXAM L-S SPINE 2/3 VWS: CPT

## 2021-06-26 PROCEDURE — 81003 URINALYSIS AUTO W/O SCOPE: CPT

## 2021-06-26 PROCEDURE — 85025 COMPLETE CBC W/AUTO DIFF WBC: CPT

## 2021-06-26 PROCEDURE — 80048 BASIC METABOLIC PNL TOTAL CA: CPT

## 2021-06-26 PROCEDURE — 99284 EMERGENCY DEPT VISIT MOD MDM: CPT

## 2021-06-26 PROCEDURE — 72070 X-RAY EXAM THORAC SPINE 2VWS: CPT

## 2021-06-26 PROCEDURE — 83690 ASSAY OF LIPASE: CPT

## 2021-06-26 PROCEDURE — 6370000000 HC RX 637 (ALT 250 FOR IP): Performed by: PHYSICIAN ASSISTANT

## 2021-06-26 PROCEDURE — 80076 HEPATIC FUNCTION PANEL: CPT

## 2021-06-26 PROCEDURE — 6360000004 HC RX CONTRAST MEDICATION: Performed by: EMERGENCY MEDICINE

## 2021-06-26 RX ORDER — HYDROCODONE BITARTRATE AND ACETAMINOPHEN 5; 325 MG/1; MG/1
1 TABLET ORAL EVERY 4 HOURS PRN
Qty: 12 TABLET | Refills: 0 | Status: SHIPPED | OUTPATIENT
Start: 2021-06-26 | End: 2021-06-29

## 2021-06-26 RX ORDER — LIDOCAINE 4 G/G
1 PATCH TOPICAL DAILY
Status: DISCONTINUED | OUTPATIENT
Start: 2021-06-26 | End: 2021-06-26 | Stop reason: HOSPADM

## 2021-06-26 RX ORDER — LIDOCAINE 50 MG/G
1 PATCH TOPICAL DAILY
Qty: 30 PATCH | Refills: 0 | Status: SHIPPED | OUTPATIENT
Start: 2021-06-26

## 2021-06-26 RX ORDER — ACETAMINOPHEN 325 MG/1
650 TABLET ORAL ONCE
Status: COMPLETED | OUTPATIENT
Start: 2021-06-26 | End: 2021-06-26

## 2021-06-26 RX ADMIN — ACETAMINOPHEN 650 MG: 325 TABLET ORAL at 11:18

## 2021-06-26 RX ADMIN — IOPAMIDOL 80 ML: 755 INJECTION, SOLUTION INTRAVENOUS at 12:43

## 2021-06-26 ASSESSMENT — ENCOUNTER SYMPTOMS
NAUSEA: 0
COUGH: 0
ABDOMINAL PAIN: 0
VOMITING: 0
SHORTNESS OF BREATH: 0
PHOTOPHOBIA: 0
BACK PAIN: 1
DIARRHEA: 0
EYE PAIN: 0

## 2021-06-26 ASSESSMENT — PAIN DESCRIPTION - LOCATION: LOCATION: BACK

## 2021-06-26 ASSESSMENT — PAIN DESCRIPTION - ORIENTATION: ORIENTATION: RIGHT

## 2021-06-26 ASSESSMENT — PAIN SCALES - GENERAL
PAINLEVEL_OUTOF10: 8
PAINLEVEL_OUTOF10: 8

## 2021-06-26 ASSESSMENT — PAIN DESCRIPTION - PAIN TYPE: TYPE: ACUTE PAIN

## 2021-06-26 NOTE — ED NOTES
Patient prepared for and ready to be discharged. Patient discharged at this time in no acute distress after verbalizing understanding of discharge instructions. Patient left after receiving After Visit Summary instructions.       Caio George RN  06/26/21 [id number]

## 2022-05-10 ENCOUNTER — APPOINTMENT (OUTPATIENT)
Dept: GENERAL RADIOLOGY | Age: 77
End: 2022-05-10
Payer: MEDICARE

## 2022-05-10 ENCOUNTER — HOSPITAL ENCOUNTER (EMERGENCY)
Age: 77
Discharge: HOME | End: 2022-05-10
Attending: EMERGENCY MEDICINE
Payer: MEDICARE

## 2022-05-10 VITALS
SYSTOLIC BLOOD PRESSURE: 152 MMHG | OXYGEN SATURATION: 93 % | WEIGHT: 180 LBS | HEART RATE: 64 BPM | HEIGHT: 66 IN | BODY MASS INDEX: 28.93 KG/M2 | TEMPERATURE: 97.7 F | RESPIRATION RATE: 15 BRPM | DIASTOLIC BLOOD PRESSURE: 79 MMHG

## 2022-05-10 DIAGNOSIS — M79.602 LEFT ARM PAIN: Primary | ICD-10-CM

## 2022-05-10 DIAGNOSIS — M19.90 ARTHRITIS: ICD-10-CM

## 2022-05-10 PROCEDURE — 6370000000 HC RX 637 (ALT 250 FOR IP): Performed by: STUDENT IN AN ORGANIZED HEALTH CARE EDUCATION/TRAINING PROGRAM

## 2022-05-10 PROCEDURE — 73080 X-RAY EXAM OF ELBOW: CPT

## 2022-05-10 PROCEDURE — 99283 EMERGENCY DEPT VISIT LOW MDM: CPT

## 2022-05-10 PROCEDURE — 73030 X-RAY EXAM OF SHOULDER: CPT

## 2022-05-10 PROCEDURE — 73060 X-RAY EXAM OF HUMERUS: CPT

## 2022-05-10 RX ORDER — OXYCODONE HYDROCHLORIDE 5 MG/1
5 TABLET ORAL ONCE
Status: COMPLETED | OUTPATIENT
Start: 2022-05-10 | End: 2022-05-10

## 2022-05-10 RX ORDER — LIDOCAINE 4 G/G
1 PATCH TOPICAL DAILY
Status: DISCONTINUED | OUTPATIENT
Start: 2022-05-10 | End: 2022-05-10 | Stop reason: HOSPADM

## 2022-05-10 RX ORDER — ACETAMINOPHEN 500 MG
1000 TABLET ORAL ONCE
Status: COMPLETED | OUTPATIENT
Start: 2022-05-10 | End: 2022-05-10

## 2022-05-10 RX ADMIN — ACETAMINOPHEN 1000 MG: 500 TABLET ORAL at 12:02

## 2022-05-10 RX ADMIN — IBUPROFEN 600 MG: 200 TABLET, FILM COATED ORAL at 12:03

## 2022-05-10 RX ADMIN — OXYCODONE 5 MG: 5 TABLET ORAL at 12:02

## 2022-05-10 ASSESSMENT — PAIN DESCRIPTION - ORIENTATION
ORIENTATION: LEFT
ORIENTATION: LEFT

## 2022-05-10 ASSESSMENT — PAIN - FUNCTIONAL ASSESSMENT
PAIN_FUNCTIONAL_ASSESSMENT: NONE - DENIES PAIN
PAIN_FUNCTIONAL_ASSESSMENT: ACTIVITIES ARE NOT PREVENTED
PAIN_FUNCTIONAL_ASSESSMENT: 0-10

## 2022-05-10 ASSESSMENT — PAIN DESCRIPTION - LOCATION
LOCATION: ARM
LOCATION: ARM

## 2022-05-10 ASSESSMENT — PAIN DESCRIPTION - DESCRIPTORS
DESCRIPTORS: PINS AND NEEDLES
DESCRIPTORS: PINS AND NEEDLES

## 2022-05-10 ASSESSMENT — ENCOUNTER SYMPTOMS
RESPIRATORY NEGATIVE: 1
GASTROINTESTINAL NEGATIVE: 1
EYES NEGATIVE: 1

## 2022-05-10 ASSESSMENT — PAIN SCALES - GENERAL: PAINLEVEL_OUTOF10: 10

## 2022-05-10 ASSESSMENT — PAIN DESCRIPTION - ONSET: ONSET: AWAKENED FROM SLEEP

## 2022-05-10 ASSESSMENT — PAIN DESCRIPTION - PAIN TYPE: TYPE: ACUTE PAIN

## 2022-05-10 ASSESSMENT — PAIN DESCRIPTION - FREQUENCY: FREQUENCY: CONTINUOUS

## 2022-05-10 NOTE — ED PROVIDER NOTES
[address] [facility] RESIDENT NOTE     Date of Evaluation: 5/10/2022    Chief Complaint     Arm Pain (LEFT ARM FOR 1 WEEK)    History of Present Illness     Amie Henderson is a 78 y.o. 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 Patient has extensive history of osteoarthritis and treatment joint disease. She recently finished rehab following a total shoulder replacement. Starting approximately 1 week ago, she developed pain of her left elbow and humerus. She denies any precipitating trauma or strenuous activity such as lifting heavy items. She reports her pain is worse with movement, specifically flexion about her elbow. She also has some pain in her left shoulder and some tightness of her fingers when she clenches her fist.  She was previously taking oxycodone postoperatively for pain control that was now currently only taking gabapentin for pain control. She denies any fevers, chills, or other constitutional symptoms. Review of Systems     Review of Systems   Constitutional: Negative. HENT: Negative. Eyes: Negative. Respiratory: Negative. Cardiovascular: Negative. Gastrointestinal: Negative. Endocrine: Negative. Genitourinary: Negative. Musculoskeletal: Positive for arthralgias and myalgias. Negative for joint swelling, neck pain and neck stiffness. Neurological: Negative. Hematological: Negative. Past Medical, Surgical, Family, and Social History     She has a past medical history of Carpal tunnel syndrome, Chronic gastritis, DVT (deep venous thrombosis) ([address] Hearing impairment, History of degenerative disc disease, HLD (hyperlipidemia), Hypothyroid, Peripheral neuropathy, and Pulmonary embolism ([address] She has no past surgical history on file.   Her family history is not on file. She reports that she has never smoked. She has never used smokeless tobacco. She reports that she does not drink alcohol and does not use drugs. Medications     Previous Medications    CALCIUM CITRATE-VITAMIN D 200-200 MG-UNIT TABS    Take 1 tablet by mouth 2 times daily. CITALOPRAM (CELEXA) 20 MG TABLET    Take 20 mg by mouth daily. GABAPENTIN (NEURONTIN) 300 MG CAPSULE    Take 300 mg by mouth 3 times daily. GLUCOSAMINE-CHONDROITIN 500-400 MG CAPS    Take 1 capsule by mouth 2 times daily     HYDROCHLOROTHIAZIDE (MICROZIDE) 12.5 MG CAPSULE    Take 12.5 mg by mouth every morning    LEVOTHYROXINE (SYNTHROID) 50 MCG TABLET    TAKE ONE TABLET BY MOUTH ONE-HALF HOUR BEFORE BREAKFAST WITH A FULL GLASS OF WATER    LIDOCAINE (LIDODERM) 5 %    Place 1 patch onto the skin daily 12 hours on, 12 hours off. MULTIPLE VITAMIN (MULTIVITAMIN) CAPSULE    Take 1 capsule by mouth daily. PRAVACHOL 10 MG TABLET    TAKE ONE TABLET BY MOUTH EVERY DAY    WARFARIN (COUMADIN) 5 MG TABLET    Take 7.5 mg by mouth daily      Allergies     She is allergic to penicillins and heparin. Physical Exam     INITIAL VITALS:   BP: (!) 152/79, Temp: 97.7 °F (36.5 °C), Pulse: 64, Resp: 15, SpO2: 93 %     General: 78 y.o. female, well nourished; well developed; in no apparent distress. HEENT: Normocephalic, atraumatic. Eyes: Anicteric, EOMI. Neck: Supple, full ROM, trachea midline. Pulmonary: Lungs clear to auscultation bilaterally with symmetric aeration. No wheezes/rales/rhonchi. Cardiac: Regular rate and rhythm. No murmurs/rubs/gallops. Abdomen: Soft, non-tender, non-distended. No rebound or guarding. Extremities: 2+ radial pulses. Tenderness to palpation over her left elbow olecranon process along the joint line. Tenderness to palpation over the posterior aspect of her left humerus. Mild tenderness to palpation of her left shoulder. Pain worsens with elbow flexion.   Patient able to fully extend her elbow but only able to flex to approximately 60 degrees. Patient able to abduct her shoulder to 90 degrees. No obvious joint effusions. No redness or warmth overlying the joints. Distal sensation to light touch intact. Skin: No rashes or bruising. Neuro: Alert and oriented x3. Speech normal. Moves UE and LE spontaneously and symmetrically. Psych: Mood and affect appropriate for situation. DiagnosticResults     EKG:  N/A    RADIOLOGY:  XR SHOULDER LEFT (MIN 2 VIEWS)   Final Result   1. Left shoulder prosthesis: Intact   2. No fracture or dislocation      XR HUMERUS LEFT (MIN 2 VIEWS)   Final Result   1. Through the shaft of humerus is intact   2. Intact total left shoulder prosthesis      XR ELBOW LEFT (MIN 3 VIEWS)   Final Result   1. Degenerative posttraumatic osteophytic changes        LABS:  No results found for this visit on 05/10/22. ED BEDSIDE ULTRASOUND:  None    RECENT VITALS:   BP: (!) 152/79, Temp: 97.7 °F (36.5 °C), Pulse: 64,Resp: 15, SpO2: 93 %     Procedures     None    ED Course     Nursing Notes, Past Medical Hx, Past Surgical Hx, Social Hx, Allergies, and Family Hx were reviewed. PATIENT GIVEN FOLLOWING MEDICATIONS:  Orders Placed This Encounter   Medications    oxyCODONE (ROXICODONE) immediate release tablet 5 mg    acetaminophen (TYLENOL) tablet 1,000 mg    lidocaine 4 % external patch 1 patch    ibuprofen (ADVIL;MOTRIN) tablet 600 mg    diclofenac sodium (VOLTAREN) 1 % GEL     Sig: Apply 2 g topically 4 times daily Apply to affected joint. Dispense:  150 g     Refill:  0     CONSULTS:  None    MEDICAL DECISION MAKING / ASSESSMENT / PLAN     Shannon Campo is a 78 y.o. female who presents with left arm pain localized primarily to her left elbow and left humerus. Patient is well-appearing and hemodynamically stable on arrival.  Overall, her clinical presentation is most consistent with likely worsening of her existing osteoarthritis.   Bursitis also considered given pain over the olecranon process though less likely without any evidence of swelling. Low concern for septic joint given the patient is nontoxic-appearing, afebrile, and without any joint swelling or erythema. Treated with ibuprofen, acetaminophen, oxycodone, and a Lidoderm patch. XR left shoulder/humerus/elbow without any evidence of fractures or dislocations though was notable for degenerative posttraumatic osteophytic changes of the elbow. 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 On reassessment, patient reports improvement of her pain after treatment but not complete resolution. She states that she has used diclofenac gel in the past for her pain with good effect. As such, prescribed patient diclofenac gel for ongoing pain control at home also counseled her to take Tylenol as needed. Counseled patient to follow-up with her primary care provider in the next week regarding his symptoms. This patient was also evaluated by the attending physician. All care plans were discussed and agreed upon. Clinical Impression     1. Left arm pain    2. Arthritis      Disposition     PATIENT REFERRED TO:  Norah Camarena MD  [place] [address]  [phone]    Schedule an appointment as soon as possible for a visit in 1 week      DISCHARGE MEDICATIONS:  New Prescriptions    DICLOFENAC SODIUM (VOLTAREN) 1 % GEL    Apply 2 g topically 4 times daily Apply to affected joint. DISPOSITION Discharge - Pending Orders Complete 05/10/2022 12:53:43 PM  At this time the patient has been deemed safe for discharge. Risks, benefits, and alternatives were discussed. My customary discharge instructions including strict return precautions for worsening or new symptoms have been communicated. The patient was advised to follow up with her PCP.          Dinesh Mcallister MD  05/10/22 5908

## 2022-05-10 NOTE — ED NOTES
Patient prepared for and ready to be discharged. Patient discharged at this time to home in care of self in no acute distress after verbalizing understanding of discharge instructions. Patient left after receiving After Visit Summary instructions.         Bella Waters RN  05/10/22 9634

## 2022-05-10 NOTE — ED PROVIDER NOTES
ED Attending Attestation Note     Date of evaluation: 5/10/2022    This patient was seen by the resident. I have seen and examined the patient, agree with the workup, evaluation, management and diagnosis. The care plan has been discussed. My assessment reveals adult female with pain along the posterior aspect of the left elbow primarily. There is no erythema, warmth, or appreciable swelling. It appears to track along the posterior aspect of the olecranon itself and is tender over this area, but no skin changes. Radiographs reveal some posterior chronic changes, no gross deformity otherwise. A very low suspicion for septic joint, do believe this likely be exacerbation of chronic osteoarthritis type pain. Kandy Rosas MD  05/10/22 0688

## 2022-07-07 ENCOUNTER — APPOINTMENT (OUTPATIENT)
Dept: GENERAL RADIOLOGY | Age: 77
End: 2022-07-07
Payer: MEDICARE

## 2022-07-07 ENCOUNTER — HOSPITAL ENCOUNTER (EMERGENCY)
Age: 77
Discharge: HOME | End: 2022-07-07
Attending: EMERGENCY MEDICINE
Payer: MEDICARE

## 2022-07-07 VITALS
SYSTOLIC BLOOD PRESSURE: 153 MMHG | TEMPERATURE: 97.8 F | DIASTOLIC BLOOD PRESSURE: 77 MMHG | RESPIRATION RATE: 16 BRPM | OXYGEN SATURATION: 95 % | HEART RATE: 60 BPM

## 2022-07-07 DIAGNOSIS — R55 VASOVAGAL EPISODE: ICD-10-CM

## 2022-07-07 DIAGNOSIS — S61.212A LACERATION OF RIGHT MIDDLE FINGER WITHOUT FOREIGN BODY WITHOUT DAMAGE TO NAIL, INITIAL ENCOUNTER: ICD-10-CM
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
## 2022-07-07 PROCEDURE — 73130 X-RAY EXAM OF HAND: CPT

## 2022-07-07 PROCEDURE — 12001 RPR S/N/AX/GEN/TRNK 2.5CM/<: CPT

## 2022-07-07 PROCEDURE — 6360000002 HC RX W HCPCS: Performed by: PHYSICIAN ASSISTANT

## 2022-07-07 PROCEDURE — 99283 EMERGENCY DEPT VISIT LOW MDM: CPT

## 2022-07-07 PROCEDURE — 2500000003 HC RX 250 WO HCPCS: Performed by: PHYSICIAN ASSISTANT

## 2022-07-07 PROCEDURE — 6370000000 HC RX 637 (ALT 250 FOR IP): Performed by: PHYSICIAN ASSISTANT

## 2022-07-07 PROCEDURE — 96372 THER/PROPH/DIAG INJ SC/IM: CPT

## 2022-07-07 RX ORDER — DOXYCYCLINE HYCLATE 100 MG
100 TABLET ORAL 2 TIMES DAILY
Qty: 10 TABLET | Refills: 0 | Status: SHIPPED | OUTPATIENT
Start: 2022-07-07 | End: 2022-07-12

## 2022-07-07 RX ORDER — METRONIDAZOLE 500 MG/1
500 TABLET ORAL ONCE
Status: COMPLETED | OUTPATIENT
Start: 2022-07-07 | End: 2022-07-07

## 2022-07-07 RX ORDER — ONDANSETRON 4 MG/1
4 TABLET, ORALLY DISINTEGRATING ORAL ONCE
Status: COMPLETED | OUTPATIENT
Start: 2022-07-07 | End: 2022-07-07

## 2022-07-07 RX ORDER — DOXYCYCLINE 100 MG/1
100 CAPSULE ORAL ONCE
Status: COMPLETED | OUTPATIENT
Start: 2022-07-07 | End: 2022-07-07

## 2022-07-07 RX ORDER — LIDOCAINE HYDROCHLORIDE AND EPINEPHRINE 10; 10 MG/ML; UG/ML
20 INJECTION, SOLUTION INFILTRATION; PERINEURAL ONCE
Status: COMPLETED | OUTPATIENT
Start: 2022-07-07 | End: 2022-07-07

## 2022-07-07 RX ORDER — ONDANSETRON 2 MG/ML
4 INJECTION INTRAMUSCULAR; INTRAVENOUS ONCE
Status: COMPLETED | OUTPATIENT
Start: 2022-07-07 | End: 2022-07-07

## 2022-07-07 RX ORDER — METRONIDAZOLE 500 MG/1
500 TABLET ORAL 3 TIMES DAILY
Qty: 15 TABLET | Refills: 0 | Status: SHIPPED | OUTPATIENT
Start: 2022-07-07 | End: 2022-07-12

## 2022-07-07 RX ADMIN — ONDANSETRON 4 MG: 4 TABLET, ORALLY DISINTEGRATING ORAL at 22:10

## 2022-07-07 RX ADMIN — DOXYCYCLINE 100 MG: 100 CAPSULE ORAL at 21:33

## 2022-07-07 RX ADMIN — ONDANSETRON 4 MG: 2 INJECTION INTRAMUSCULAR; INTRAVENOUS at 22:46

## 2022-07-07 RX ADMIN — LIDOCAINE HYDROCHLORIDE,EPINEPHRINE BITARTRATE 20 ML: 10; .01 INJECTION, SOLUTION INFILTRATION; PERINEURAL at 22:05

## 2022-07-07 RX ADMIN — METRONIDAZOLE 500 MG: 500 TABLET ORAL at 21:33

## 2022-07-07 ASSESSMENT — PAIN DESCRIPTION - DESCRIPTORS: DESCRIPTORS: ACHING

## 2022-07-07 ASSESSMENT — PAIN DESCRIPTION - PAIN TYPE: TYPE: ACUTE PAIN

## 2022-07-07 ASSESSMENT — PAIN - FUNCTIONAL ASSESSMENT: PAIN_FUNCTIONAL_ASSESSMENT: 0-10

## 2022-07-07 ASSESSMENT — PAIN DESCRIPTION - ORIENTATION: ORIENTATION: RIGHT

## 2022-07-07 ASSESSMENT — PAIN DESCRIPTION - LOCATION: LOCATION: HAND;FINGER (COMMENT WHICH ONE)

## 2022-07-07 ASSESSMENT — PAIN SCALES - GENERAL: PAINLEVEL_OUTOF10: 5

## 2022-07-07 ASSESSMENT — PAIN DESCRIPTION - FREQUENCY: FREQUENCY: CONTINUOUS

## 2022-07-08 NOTE — ED PROVIDER NOTES
ED Attending Attestation Note     Date of evaluation: 7/7/2022    This patient was seen by the advance practice provider. I have seen and examined the patient, agree with the workup, evaluation, management and diagnosis. The care plan has been discussed. My assessment reveals patient with dog bite to the right third and fourth digit. Patient's wound was repaired by the physician assistant independently but during the time of the repair the patient became lightheaded and bradycardic. Patient did appear to have a vasovagal episode. I was asked to evaluate the patient afterwards. On evaluation, patient is awake and alert. She has a heart rate in the 50s with normal blood pressure. Oxygen saturations are in the high 80s but her daughter, and ICU nurse, states that this is typical for her. Patient at this time is stable for discharge home for wound care and primary care follow-up.       Yifan Laughlin MD  07/07/22 7536

## 2022-07-08 NOTE — ED NOTES
Discharge instructions provided to patient by RN at bedside. No further concerns addressed at this time.      Renetta Valenzuela RN  07/07/22 6233

## 2022-07-15 ASSESSMENT — ENCOUNTER SYMPTOMS
WHEEZING: 0
CHEST TIGHTNESS: 0
EYE ITCHING: 0
VOMITING: 0
SHORTNESS OF BREATH: 0
ABDOMINAL DISTENTION: 0
SORE THROAT: 0
EYE REDNESS: 0
COLOR CHANGE: 0
DIARRHEA: 0
EYE PAIN: 0
SINUS PRESSURE: 0
BACK PAIN: 0
ABDOMINAL PAIN: 0
NAUSEA: 0
COUGH: 0
RHINORRHEA: 0

## 2022-07-15 NOTE — ED PROVIDER NOTES
[address] ENCOUNTER          PHYSICIAN ASSISTANT NOTE       Date of evaluation: 7/7/2022    Chief Complaint     Animal Bite (dog bite around 1030am, right hand middle finger states \"a chunk of my middle finger is gone\")      History of Present Illness     Vince He is a 78 y.o. female with a past medical history as noted below who presents to the Emergency Department with a complaint of dog bite. The patient, who is deaf, presents with her daughter to the emergency department after reportedly being bitten by a neighbor's dog at approximately 10:30 AM.  The patient's daughter reports that her mother came over this afternoon, and she noted that her finger was wrapped in bandaging. When she asked what happened, she reported that she had been bitten by dog earlier in the day. According to the owners, the dog is up-to-date on its immunizations. The bite involved the patient's right middle finger. The patient notes that \"a chunk of my middle finger is gone. \"  The patient reports an aching pain that she rates as a 5 out of 10 on the pain scale. She has not taken anything for pain. She can move her finger without difficulty and has no numbness or tingling distally. No other injuries noted. Her tetanus immunization is up-to-date. Review of Systems     Review of Systems   Constitutional:  Negative for chills, diaphoresis, fever and unexpected weight change. HENT:  Negative for congestion, drooling, mouth sores, postnasal drip, rhinorrhea, sinus pressure and sore throat. Eyes:  Negative for pain, redness and itching. Respiratory:  Negative for cough, chest tightness, shortness of breath and wheezing. Cardiovascular:  Negative for chest pain, palpitations and leg swelling. Gastrointestinal:  Negative for abdominal distention, abdominal pain, diarrhea, nausea and vomiting. Genitourinary:  Negative for dysuria and hematuria.    Musculoskeletal:  Negative for arthralgias, back pain, gait problem, myalgias, neck pain and neck stiffness. Skin:  Positive for wound. Negative for color change, pallor and rash. Neurological:  Negative for dizziness, seizures, syncope, weakness, light-headedness, numbness and headaches. Hematological:  Does not bruise/bleed easily. Psychiatric/Behavioral:  Negative for agitation, hallucinations, self-injury, sleep disturbance and suicidal ideas. The patient is not nervous/anxious. All other systems reviewed and are negative. Physical Exam     INITIAL VITALS: BP: (!) 153/77, Temp: 97.8 °F (36.6 °C), Heart Rate: 60, Resp: 16, SpO2: 95 %     Nursing note and vitals reviewed. Physical Exam  Vitals and nursing note reviewed. Constitutional:       General: She is not in acute distress. Appearance: She is well-developed. She is not ill-appearing or diaphoretic. HENT:      Head: Normocephalic and atraumatic. Nose: No congestion or rhinorrhea. Mouth/Throat:      Mouth: No injury. Eyes:      General: No visual field deficit or scleral icterus. Pupils: Pupils are equal, round, and reactive to light. Neck:      Vascular: No JVD. Cardiovascular:      Rate and Rhythm: Normal rate and regular rhythm. Pulmonary:      Effort: Pulmonary effort is normal. No respiratory distress. Breath sounds: Normal breath sounds. No stridor. No wheezing, rhonchi or rales. Chest:      Chest wall: No tenderness. Abdominal:      General: There is no distension. Palpations: Abdomen is soft. Tenderness: There is no abdominal tenderness. There is no guarding or rebound. Musculoskeletal:         General: Normal range of motion. Cervical back: Full passive range of motion without pain, normal range of motion and neck supple. Skin:     General: Skin is warm and dry. Coloration: Skin is not pale. Findings: Laceration present. No rash.           Neurological:      Mental Status: She is alert and oriented to person, place, and time. GCS: GCS eye subscore is 4. GCS verbal subscore is 5. GCS motor subscore is 6. Cranial Nerves: No cranial nerve deficit, dysarthria or facial asymmetry. Motor: No weakness or pronator drift. Psychiatric:         Attention and Perception: Attention normal.         Mood and Affect: Mood and affect normal.         Speech: Speech normal.        Procedures     Laceration Repair Procedure Note    Indication: Laceration of the right third finger    Procedure: The patient was placed in the appropriate position and anesthesia around the laceration was obtained with a full digital block of the right long (middle) finger using 4.0 cc of 1% lidocaine with epinephrine. The area was then cleansed using chlorhexidine, irrigated with high pressure Shur-Clens and normal saline solution, explored with no foreign bodies discovered and no tendon injury noted, and draped in a sterile fashion. The laceration was closely reapproximated with a 3-0 Ethilon suture across the entire wound, then edge repair and further reapproximations made with 5-0 Ethilon using interrupted sutures. There is a fair amount of skin and superficial tissue missing, so the wound will remain largely open for healing by secondary intent. There were no additional lacerations requiring repair. The wound area was then dressed with a sterile dressing and a bandage. Total repaired wound length: 2.5 cm. Suture count: 7    Other Items: None    The patient tolerated the procedure well, although the patient had a vasovagal event prompting termination of the procedure. Complications: Vasovagal episode with nausea and vomiting    MEDICAL DECISION MAKING     Donna Person is admitted to the Emergency Department for evaluation of her chief complaint as described in the history of present illness. Complete history and physical was performed by me and my attending.  Nursing notes, past medical history, surgical history, family history and social history were reviewed and addressed in the HPI. Ru Orosco is a 78 y.o. female who presents to the emergency department with a complaint of dog bite. The patient sustained a dog bite of the right middle finger by an unknown dog earlier in the day today. The bite was discovered by the patient's daughter earlier this afternoon, who irrigated (the patient's daughter is a nurse) and brought her to the emergency department for evaluation. On presentation to the emergency department, the patient was hemodynamically stable and within normal limits. The patient had a macerated laceration/skin avulsion of the palmar surface of the right third finger between the MCP and PIP caused by a dog bite. The patient's family was able to confirm with the neighbor who owns the dog but the dog's immunizations are up-to-date. Wound repair as above. Shortly after beginning the wound repair, the patient became pale and nauseated and vomited a couple of times. The procedure was halted at this point and the patient was treated with Zofran ODT. Initially, she reports that she is feeling somewhat better, so the procedure was continued, but the patient continued to become. More nauseated, prompting us to move her to a room where she can be laid down in the bed. The patient was administered an IM dose of Zofran and after placing the patient in a semi-Cormier position, the patient immediately started feeling improved, with her color restored. She never demonstrated any hemodynamic abnormalities. Her episode appears to be most consistent with a vasovagal episode. Her ECG rhythm was a sinus bradycardia, just slightly above the patient's baseline rate. She never became hypotensive, tachypneic or demonstrated any syncope or near syncopal sensation. At this point, the wound had been reapproximated enough to allow for healing by secondary intent.   The patient is allergic to penicillin, we will treat with doxycycline and Flagyl for canine oral maya coverage. I discussed this plan at length the patient who verbalizes understanding and is in agreement. The patient is currently stable and will be discharged home for continued self-care. Please see patient's AVS for additional discharge instructions. The patient was seen and evaluated by myself and the attending physician, Regan Cifeuntes MD, who agrees with my assessment, treatment and plan. Clinical Impression     1. Dog bite, initial encounter    2. Laceration of right middle finger without foreign body without damage to nail, initial encounter    3. Vasovagal episode        Disposition     DISPOSITION Decision To Discharge 07/07/2022 11:23:04 PM        Merna Solomonurbano Maxwell PA-C  11:00 AM    Relevant History and Diagnostic Information     Past Medical, Surgical, Family, and Social History     She has a past medical history of Carpal tunnel syndrome, Chronic gastritis, DVT (deep venous thrombosis) ([address] Hearing impairment, History of degenerative disc disease, HLD (hyperlipidemia), Hypothyroid, Peripheral neuropathy, and Pulmonary embolism ([address] She has no past surgical history on file. Her family history is not on file. She reports that she has never smoked. She has never used smokeless tobacco. She reports that she does not drink alcohol and does not use drugs. Medications     Discharge Medication List as of 7/7/2022 11:30 PM        CONTINUE these medications which have NOT CHANGED    Details   diclofenac sodium (VOLTAREN) 1 % GEL Apply 2 g topically 4 times daily Apply to affected joint., Topical, 4 TIMES DAILY Starting Tue 5/10/2022, Disp-150 g, R-0, Print      lidocaine (LIDODERM) 5 % Place 1 patch onto the skin daily 12 hours on, 12 hours off., Disp-30 patch, R-0Print      warfarin (COUMADIN) 5 MG tablet Take 7.5 mg by mouth daily Historical Med      gabapentin (NEURONTIN) 300 MG capsule Take 300 mg by mouth 3 times daily.  Historical Med hydrochlorothiazide (MICROZIDE) 12.5 MG capsule Take 12.5 mg by mouth every morning      levothyroxine (SYNTHROID) 50 MCG tablet TAKE ONE TABLET BY MOUTH ONE-HALF HOUR BEFORE BREAKFAST WITH A FULL GLASS OF WATER, Disp-30 tablet, R-6      PRAVACHOL 10 MG tablet TAKE ONE TABLET BY MOUTH EVERY DAY, Disp-30 tablet, R-6      citalopram (CELEXA) 20 MG tablet Take 20 mg by mouth daily. Glucosamine-Chondroitin 500-400 MG CAPS Take 1 capsule by mouth 2 times daily       Multiple Vitamin (MULTIVITAMIN) capsule Take 1 capsule by mouth daily. Calcium Citrate-Vitamin D 200-200 MG-UNIT TABS Take 1 tablet by mouth 2 times daily. Allergies     She is allergic to penicillins and heparin. ED Course     Nursing Notes, Past Medical Hx, Past Surgical Hx, Social Hx,Allergies, and Family Hx were reviewed. Patient was given the following medications:  Orders Placed This Encounter   Medications    lidocaine-EPINEPHrine 1 %-1:243215 injection 20 mL    doxycycline monohydrate (MONODOX) capsule 100 mg     Order Specific Question:   Antimicrobial Indications     Answer:   STD infection    metroNIDAZOLE (FLAGYL) tablet 500 mg     Order Specific Question:   Antimicrobial Indications     Answer: Other     Order Specific Question:   Other Abx Indication     Answer:   dog bite prophylaxis    ondansetron (ZOFRAN-ODT) disintegrating tablet 4 mg    ondansetron (ZOFRAN) injection 4 mg    doxycycline hyclate (VIBRA-TABS) 100 MG tablet     Sig: Take 1 tablet by mouth 2 times daily for 5 days     Dispense:  10 tablet     Refill:  0    metroNIDAZOLE (FLAGYL) 500 MG tablet     Sig: Take 1 tablet by mouth 3 times daily for 5 days     Dispense:  15 tablet     Refill:  0       Diagnostic Results     RECENT VITALS:  BP: (!) 153/77,Temp: 97.8 °F (36.6 °C), Heart Rate: 60, Resp: 16, SpO2: 95 %     RADIOLOGY:  XR HAND RIGHT (MIN 3 VIEWS)   Final Result      1.   No findings for acute traumatic bony abnormality with moderate

## 2022-08-11 ENCOUNTER — HOSPITAL ENCOUNTER (OUTPATIENT)
Dept: GENERAL RADIOLOGY | Age: 77
Discharge: HOME | End: 2022-08-11
Payer: MEDICARE

## 2022-08-11 ENCOUNTER — HOSPITAL ENCOUNTER (OUTPATIENT)
Age: 77
Discharge: HOME | End: 2022-08-11
Payer: MEDICARE

## 2022-08-11 DIAGNOSIS — M54.50 ACUTE BILATERAL LOW BACK PAIN, UNSPECIFIED WHETHER SCIATICA PRESENT: ICD-10-CM

## 2022-08-11 PROCEDURE — 72100 X-RAY EXAM L-S SPINE 2/3 VWS: CPT

## 2023-09-21 ENCOUNTER — HOSPITAL ENCOUNTER (OUTPATIENT)
Dept: WOUND CARE | Age: 78
Discharge: HOME | End: 2023-09-21
Payer: MEDICARE

## 2023-09-21 VITALS
DIASTOLIC BLOOD PRESSURE: 80 MMHG | RESPIRATION RATE: 16 BRPM | SYSTOLIC BLOOD PRESSURE: 177 MMHG | TEMPERATURE: 98 F | HEART RATE: 63 BPM

## 2023-09-21 DIAGNOSIS — L97.212 NON-PRESSURE CHRONIC ULCER OF RIGHT CALF WITH FAT LAYER EXPOSED (HCC): ICD-10-CM

## 2023-09-21 PROCEDURE — 99213 OFFICE O/P EST LOW 20 MIN: CPT

## 2023-09-21 PROCEDURE — 11042 DBRDMT SUBQ TIS 1ST 20SQCM/<: CPT

## 2023-09-21 RX ORDER — GINSENG 100 MG
CAPSULE ORAL ONCE
OUTPATIENT
Start: 2023-09-21 | End: 2023-09-21

## 2023-09-21 RX ORDER — CLINDAMYCIN HYDROCHLORIDE 300 MG/1
300 CAPSULE ORAL 3 TIMES DAILY
COMMUNITY
End: 2023-09-21

## 2023-09-21 RX ORDER — CLOBETASOL PROPIONATE 0.5 MG/G
OINTMENT TOPICAL ONCE
OUTPATIENT
Start: 2023-09-21 | End: 2023-09-21

## 2023-09-21 RX ORDER — SODIUM CHLOR/HYPOCHLOROUS ACID 0.033 %
SOLUTION, IRRIGATION IRRIGATION ONCE
OUTPATIENT
Start: 2023-09-21 | End: 2023-09-21

## 2023-09-21 RX ORDER — LIDOCAINE 40 MG/G
CREAM TOPICAL ONCE
OUTPATIENT
Start: 2023-09-21 | End: 2023-09-21

## 2023-09-21 RX ORDER — LIDOCAINE HYDROCHLORIDE 20 MG/ML
JELLY TOPICAL ONCE
OUTPATIENT
Start: 2023-09-21 | End: 2023-09-21

## 2023-09-21 RX ORDER — AZITHROMYCIN 250 MG/1
TABLET, FILM COATED ORAL
Qty: 11 TABLET | Refills: 0 | Status: SHIPPED | OUTPATIENT
Start: 2023-09-21 | End: 2023-10-01

## 2023-09-21 RX ORDER — LIDOCAINE 50 MG/G
OINTMENT TOPICAL ONCE
OUTPATIENT
Start: 2023-09-21 | End: 2023-09-21

## 2023-09-21 RX ORDER — TRIAMCINOLONE ACETONIDE 1 MG/G
OINTMENT TOPICAL ONCE
OUTPATIENT
Start: 2023-09-21 | End: 2023-09-21

## 2023-09-21 RX ORDER — BETAMETHASONE DIPROPIONATE 0.05 %
OINTMENT (GRAM) TOPICAL ONCE
OUTPATIENT
Start: 2023-09-21 | End: 2023-09-21

## 2023-09-21 RX ORDER — BACITRACIN ZINC AND POLYMYXIN B SULFATE 500; 1000 [USP'U]/G; [USP'U]/G
OINTMENT TOPICAL ONCE
OUTPATIENT
Start: 2023-09-21 | End: 2023-09-21

## 2023-09-21 RX ORDER — IBUPROFEN 200 MG
TABLET ORAL ONCE
OUTPATIENT
Start: 2023-09-21 | End: 2023-09-21

## 2023-09-21 RX ORDER — LIDOCAINE HYDROCHLORIDE 40 MG/ML
SOLUTION TOPICAL ONCE
OUTPATIENT
Start: 2023-09-21 | End: 2023-09-21

## 2023-09-21 RX ORDER — GENTAMICIN SULFATE 1 MG/G
OINTMENT TOPICAL ONCE
OUTPATIENT
Start: 2023-09-21 | End: 2023-09-21

## 2023-09-21 ASSESSMENT — PAIN DESCRIPTION - ORIENTATION: ORIENTATION: LEFT

## 2023-09-21 ASSESSMENT — PAIN DESCRIPTION - LOCATION: LOCATION: ANKLE

## 2023-09-21 ASSESSMENT — PAIN DESCRIPTION - PAIN TYPE: TYPE: ACUTE PAIN

## 2023-09-21 ASSESSMENT — PAIN SCALES - GENERAL: PAINLEVEL_OUTOF10: 10

## 2023-09-21 ASSESSMENT — PAIN DESCRIPTION - DESCRIPTORS: DESCRIPTORS: THROBBING

## 2023-09-21 ASSESSMENT — PAIN DESCRIPTION - FREQUENCY: FREQUENCY: CONTINUOUS

## 2023-09-21 NOTE — PATIENT INSTRUCTIONS
[facility] Physician Orders and Discharge Instructions  [address] [address] [address] [place] [address]. [facility]  Telephone: [phone]  NAME:  Danisha Sanches OF BIRTH:  1945  MEDICAL RECORD NUMBER:  [id number]  DATE: 9/21/23     Return Appointment:  Return Appointment: With Doreen Stack DPM  in  [facility])  [] Return Appointment for a Wound Assessment with the nurse on:     No future appointments. [facility]: [address]:  Phone: [phone]  Fax: [phone]      Medically necessary services for evaluation and treatment: [x]Skilled Nursing (using clean technique) []PT (Eval & Treat) []OT (Eval & Treat) []Social Work []Dietician []Other:      Wound care instructions: If you smoke we ask that you refrain from smoking. Smoking inhibits wounds from healing. When taking antibiotics take the entire prescription as ordered. Do not stop taking until medication is all gone unless otherwise instructed. Exercise as tolerated. Keep weight off wounds and reposition every 2 hours if applicable. Do not get wounds wet in the bath or shower unless otherwise instructed by your physician. If your wound is on your foot or leg, you may purchase a cast bag. Please ask at the pharmacy. Wash hands with soap and water prior to and after every dressing change. [x]Wash wounds with: 0.9% normal saline  [x]Kimmie wound Topical Treatments: Do not apply lotions, creams, or ointments to the skin around the wound bed unless directed as followed:   Apply around the wound: Nothing         [x]Wound Location: right lower leg   Apply Primary Dressing to wound: Michelle  Secondary Dressing: 4X4 gauze pad, Conforming roll gauze, and Spandage   Avoid contact of tape with skin if possible. When to change Dressing: 3 times per week:Monday/Wednesday/Friday            [x] Edema Control:  Apply: Medigrip on the Right; (Single medium).  They should be applied to

## 2023-09-21 NOTE — PROGRESS NOTES
minutes 4-5 times a day and/or when sitting. Avoid prolonged standing in one place. Dietary:  Important dietary reminders:  1. Increase Protein intake (i.e. Lean meats, fish, eggs, legumes, and yogurt)  2. No added salt  3. If diabetic, follow a diabetic diet and check glucose prior to meals or as instructed by your physician. Dietary Supplements(Take twice a day unless instructed otherwise):  [] Madeline Raul  [] 30ml ProStat [] Ensure Complete [] Ensure Max/Premier [] Expedite [] Other:    Your nurse  is:  Elizabeth Donovan     Electronically signed by Gabino Oneil RN on 9/21/2023 at [address]: Should you experience any significant changes in your wound(s) or have questions about your wound care, please contact the [facility] at [phone]. Hours of operation:  Mon:  8AM - 2PM  Tue: 11AM - 5PM  Wed: CLOSED  Thur: 8AM - 4:30PM  Fri:  8AM - 4:30PM  The office is closed on all major holidays. Please give us 24-48 business hours to return your call. These hours of operation are subject to change. If you need help with your wounds and cannot wait until we are available, contact your PCP or go to your preferred emergency room. Call your doctor now or seek immediate medical care if:    You have symptoms of infection, such as: Increased pain, swelling, warmth, or redness. Red streaks leading from the area. Pus draining from the area. A fever.          [] Patient unable to sign Discharge Instructions given to ECF/Transportation/POA          Electronically signed by Joesph Matamoros DPM on 9/21/2023 at 1:48 PM

## 2023-10-05 ENCOUNTER — HOSPITAL ENCOUNTER (OUTPATIENT)
Dept: WOUND CARE | Age: 78
Discharge: HOME | End: 2023-10-05
Attending: PODIATRIST
Payer: MEDICARE

## 2023-10-05 VITALS
TEMPERATURE: 98 F | DIASTOLIC BLOOD PRESSURE: 80 MMHG | RESPIRATION RATE: 18 BRPM | SYSTOLIC BLOOD PRESSURE: 175 MMHG | HEART RATE: 72 BPM

## 2023-10-05 DIAGNOSIS — L97.212 NON-PRESSURE CHRONIC ULCER OF RIGHT CALF WITH FAT LAYER EXPOSED (HCC): Primary | ICD-10-CM

## 2023-10-05 PROCEDURE — 11042 DBRDMT SUBQ TIS 1ST 20SQCM/<: CPT

## 2023-10-05 PROCEDURE — 6370000000 HC RX 637 (ALT 250 FOR IP): Performed by: PODIATRIST

## 2023-10-05 RX ORDER — LIDOCAINE 40 MG/G
CREAM TOPICAL ONCE
OUTPATIENT
Start: 2023-10-05 | End: 2023-10-05

## 2023-10-05 RX ORDER — GINSENG 100 MG
CAPSULE ORAL ONCE
OUTPATIENT
Start: 2023-10-05 | End: 2023-10-05

## 2023-10-05 RX ORDER — GENTAMICIN SULFATE 1 MG/G
OINTMENT TOPICAL ONCE
OUTPATIENT
Start: 2023-10-05 | End: 2023-10-05

## 2023-10-05 RX ORDER — LIDOCAINE HYDROCHLORIDE 20 MG/ML
JELLY TOPICAL ONCE
OUTPATIENT
Start: 2023-10-05 | End: 2023-10-05

## 2023-10-05 RX ORDER — IBUPROFEN 200 MG
TABLET ORAL ONCE
OUTPATIENT
Start: 2023-10-05 | End: 2023-10-05

## 2023-10-05 RX ORDER — TRIAMCINOLONE ACETONIDE 1 MG/G
OINTMENT TOPICAL ONCE
OUTPATIENT
Start: 2023-10-05 | End: 2023-10-05

## 2023-10-05 RX ORDER — BETAMETHASONE DIPROPIONATE 0.05 %
OINTMENT (GRAM) TOPICAL ONCE
OUTPATIENT
Start: 2023-10-05 | End: 2023-10-05

## 2023-10-05 RX ORDER — SODIUM CHLOR/HYPOCHLOROUS ACID 0.033 %
SOLUTION, IRRIGATION IRRIGATION ONCE
OUTPATIENT
Start: 2023-10-05 | End: 2023-10-05

## 2023-10-05 RX ORDER — LIDOCAINE HYDROCHLORIDE 40 MG/ML
SOLUTION TOPICAL ONCE
Status: COMPLETED | OUTPATIENT
Start: 2023-10-05 | End: 2023-10-05

## 2023-10-05 RX ORDER — LIDOCAINE HYDROCHLORIDE 40 MG/ML
SOLUTION TOPICAL ONCE
OUTPATIENT
Start: 2023-10-05 | End: 2023-10-05

## 2023-10-05 RX ORDER — CLOBETASOL PROPIONATE 0.5 MG/G
OINTMENT TOPICAL ONCE
OUTPATIENT
Start: 2023-10-05 | End: 2023-10-05

## 2023-10-05 RX ORDER — BACITRACIN ZINC AND POLYMYXIN B SULFATE 500; 1000 [USP'U]/G; [USP'U]/G
OINTMENT TOPICAL ONCE
OUTPATIENT
Start: 2023-10-05 | End: 2023-10-05

## 2023-10-05 RX ORDER — LIDOCAINE 50 MG/G
OINTMENT TOPICAL ONCE
OUTPATIENT
Start: 2023-10-05 | End: 2023-10-05

## 2023-10-05 RX ADMIN — LIDOCAINE HYDROCHLORIDE: 40 SOLUTION TOPICAL at 14:47

## 2023-10-05 NOTE — PROGRESS NOTES
[facility]  Progress Note and Procedure Note      [facility] RECORD NUMBER:  [id number]  AGE: 70 y.o. GENDER: female  : 1945  EPISODE DATE:  10/5/2023    Subjective:     Chief Complaint   Patient presents with    Wound Check     Right lower leg         HISTORY of PRESENT ILLNESS HPI     Avila Felix is a 70 y.o. female who presents today for wound/ulcer evaluation. History of Wound Context: Patient presents today by herself. Patient is deaf, but can read lips and written communication works well for the patient. Wound/Ulcer Pain Timing/Severity: intermittent, moderate  Quality of pain: burning  Severity:  5 / 10   Modifying Factors: None  Associated Signs/Symptoms: pain    Ulcer Identification:  Ulcer Type: traumatic    Contributing Factors: none    Acute Wound: N/A not an acute wound        PAST MEDICAL HISTORY        Diagnosis Date    Carpal tunnel syndrome     Chronic gastritis 2014    Admitted to [facility] with Gi symptoms. EDG revealed +ve H. Pylori, No treatment as of 16 because of warfarin. DVT (deep venous thrombosis) ([address]) 2012    Following R TKA. Warfarin through Oct 2012, however following L TKA in Oct 2014 post tibial and peroneal vein clot noted. On long term warfarin. Hearing impairment     History of degenerative disc disease     HLD (hyperlipidemia)     Hypothyroid 2014    assoc with peripheral neuropathy. Peripheral neuropathy 2014    Gabapentin 300 mg QHS helps. Pulmonary embolism ([address]) 2012    assoc with DVT following R TKA. Treated at [facility]. PAST SURGICAL HISTORY    Past Surgical History:   Procedure Laterality Date    JOINT REPLACEMENT         FAMILY HISTORY    History reviewed. No pertinent family history.     SOCIAL HISTORY    Social History     Tobacco Use    Smoking status: Never    Smokeless tobacco: Never   Substance Use Topics    Alcohol use: No    Drug use: No       ALLERGIES    Allergies

## 2023-10-05 NOTE — DISCHARGE INSTRUCTIONS
[facility] Physician Orders and Discharge Instructions  [address] [address] [address] [place] [address]. [facility]  Telephone: [phone]  NAME:  Patrick Whitfield OF BIRTH:  1945  MEDICAL RECORD NUMBER:  [id number]  DATE: 10/5/23     Return Appointment:  Return Appointment: With Lety Martinez DPM  in  1 [facility])  [] Return Appointment for a Wound Assessment with the nurse on:     No future appointments. [facility]: [address]:  Phone: [phone]  Fax: [phone]        Medically necessary services for evaluation and treatment:      [x]Skilled Nursing (using clean technique)        []PT (Eval & Treat)    []OT (Eval & Treat)    []Social Work   []Dietician      []Other:        Wound care instructions: If you smoke we ask that you refrain from smoking. Smoking inhibits wounds from healing. When taking antibiotics take the entire prescription as ordered. Do not stop taking until medication is all gone unless otherwise instructed. Exercise as tolerated. Keep weight off wounds and reposition every 2 hours if applicable. Do not get wounds wet in the bath or shower unless otherwise instructed by your physician. If your wound is on your foot or leg, you may purchase a cast bag. Please ask at the pharmacy. Wash hands with soap and water prior to and after every dressing change. [x]Wash wounds with: 0.9% normal saline  [x]Kimmie wound Topical Treatments: Do not apply lotions, creams, or ointments to the skin around the wound bed unless directed as followed:   Apply around the wound: Nothing                    [x]Wound Location: right lower leg              Apply Primary Dressing to wound: Michelle  Secondary Dressing: 4X4 gauze pad, Conforming roll gauze, and Spandage              Avoid contact of tape with skin if possible.   When to change Dressing: 3 times per week:Monday/Wednesday/Friday                                     [x]

## 2023-10-12 ENCOUNTER — HOSPITAL ENCOUNTER (OUTPATIENT)
Dept: WOUND CARE | Age: 78
Discharge: HOME | End: 2023-10-12
Attending: PODIATRIST
Payer: MEDICARE

## 2023-10-12 VITALS
HEART RATE: 72 BPM | RESPIRATION RATE: 16 BRPM | TEMPERATURE: 96.8 F | SYSTOLIC BLOOD PRESSURE: 130 MMHG | DIASTOLIC BLOOD PRESSURE: 75 MMHG

## 2023-10-12 DIAGNOSIS — L97.212 NON-PRESSURE CHRONIC ULCER OF RIGHT CALF WITH FAT LAYER EXPOSED (HCC): Primary | ICD-10-CM

## 2023-10-12 PROCEDURE — 99212 OFFICE O/P EST SF 10 MIN: CPT

## 2023-10-12 RX ORDER — TRIAMCINOLONE ACETONIDE 1 MG/G
OINTMENT TOPICAL ONCE
Status: CANCELLED | OUTPATIENT
Start: 2023-10-12 | End: 2023-10-12

## 2023-10-12 RX ORDER — LIDOCAINE HYDROCHLORIDE 20 MG/ML
JELLY TOPICAL ONCE
Status: CANCELLED | OUTPATIENT
Start: 2023-10-12 | End: 2023-10-12

## 2023-10-12 RX ORDER — IBUPROFEN 200 MG
TABLET ORAL ONCE
Status: CANCELLED | OUTPATIENT
Start: 2023-10-12 | End: 2023-10-12

## 2023-10-12 RX ORDER — GENTAMICIN SULFATE 1 MG/G
OINTMENT TOPICAL ONCE
Status: CANCELLED | OUTPATIENT
Start: 2023-10-12 | End: 2023-10-12

## 2023-10-12 RX ORDER — LIDOCAINE 50 MG/G
OINTMENT TOPICAL ONCE
Status: CANCELLED | OUTPATIENT
Start: 2023-10-12 | End: 2023-10-12

## 2023-10-12 RX ORDER — GINSENG 100 MG
CAPSULE ORAL ONCE
Status: CANCELLED | OUTPATIENT
Start: 2023-10-12 | End: 2023-10-12

## 2023-10-12 RX ORDER — LIDOCAINE 40 MG/G
CREAM TOPICAL ONCE
Status: CANCELLED | OUTPATIENT
Start: 2023-10-12 | End: 2023-10-12

## 2023-10-12 RX ORDER — BETAMETHASONE DIPROPIONATE 0.05 %
OINTMENT (GRAM) TOPICAL ONCE
Status: CANCELLED | OUTPATIENT
Start: 2023-10-12 | End: 2023-10-12

## 2023-10-12 RX ORDER — SODIUM CHLOR/HYPOCHLOROUS ACID 0.033 %
SOLUTION, IRRIGATION IRRIGATION ONCE
Status: CANCELLED | OUTPATIENT
Start: 2023-10-12 | End: 2023-10-12

## 2023-10-12 RX ORDER — BACITRACIN ZINC AND POLYMYXIN B SULFATE 500; 1000 [USP'U]/G; [USP'U]/G
OINTMENT TOPICAL ONCE
Status: CANCELLED | OUTPATIENT
Start: 2023-10-12 | End: 2023-10-12

## 2023-10-12 RX ORDER — LIDOCAINE HYDROCHLORIDE 40 MG/ML
SOLUTION TOPICAL ONCE
Status: DISCONTINUED | OUTPATIENT
Start: 2023-10-12 | End: 2023-10-13 | Stop reason: HOSPADM

## 2023-10-12 RX ORDER — CLOBETASOL PROPIONATE 0.5 MG/G
OINTMENT TOPICAL ONCE
Status: CANCELLED | OUTPATIENT
Start: 2023-10-12 | End: 2023-10-12

## 2023-10-12 RX ORDER — LIDOCAINE HYDROCHLORIDE 40 MG/ML
SOLUTION TOPICAL ONCE
Status: CANCELLED | OUTPATIENT
Start: 2023-10-12 | End: 2023-10-12

## 2023-10-12 NOTE — PATIENT INSTRUCTIONS
[facility] Physician Orders   [address] [facility]   [address] [facility]. [facility]  Telephone: [phone]    NAME:  Jaz Camp OF BIRTH:  1945  MEDICAL RECORD NUMBER:  [id number]  DATE:  10/12/2023      [address] [facility] [address]:  Phone: [phone]  Fax: [phone] woundcare      Congratulations!! You have completed your treatment. Return to your Primary Care Physician for all your health issues. Resume your ordinary activities as tolerated. Take your medications as prescribed by your primary care physician. Check your skin daily for cracks, bruises, sores, or dryness. Use a moisturizer as needed. Clean and dry your skin, using mild soap and warm water (not hot). Avoid alcohol and caffeine and do not smoke. Maintain a nutritious diet. Ask your primary care doctor about adding a multivitamin to your medication regimen. Avoid pressure on your healed wound site. Additional instructions for healed wound/skin care:none      [address] [facility] TO SERVE YOU.  PLEASE CALL IF YOU DEVELOP ANOTHER WOUND [phone]    [] Patient unable to sign Discharge Instructions given to ECF/Transportation/POA    Electronically signed by Yara Camacho RN on 10/12/2023 at 2:50 PM

## 2023-10-12 NOTE — PROGRESS NOTES
Cephalexin Rash     Other reaction(s): rash      Heparin      Lovenox         MEDICATIONS    Current Outpatient Medications on File Prior to Encounter   Medication Sig Dispense Refill    rivaroxaban (XARELTO) 20 MG TABS tablet Take 1 tablet by mouth      diclofenac sodium (VOLTAREN) 1 % GEL Apply 2 g topically 4 times daily Apply to affected joint. (Patient not taking: Reported on 9/21/2023) 150 g 0    lidocaine (LIDODERM) 5 % Place 1 patch onto the skin daily 12 hours on, 12 hours off. (Patient not taking: Reported on 9/21/2023) 30 patch 0    gabapentin (NEURONTIN) 300 MG capsule Take 300 mg by mouth 3 times daily. (Patient not taking: Reported on 9/21/2023)      hydrochlorothiazide (MICROZIDE) 12.5 MG capsule Take 12.5 mg by mouth every morning (Patient not taking: Reported on 9/21/2023)      levothyroxine (SYNTHROID) 50 MCG tablet TAKE ONE TABLET BY MOUTH ONE-HALF HOUR BEFORE BREAKFAST WITH A FULL GLASS OF WATER (Patient taking differently: Take 1 tablet by mouth Daily TAKE ONE TABLET BY MOUTH ONE-HALF HOUR BEFORE BREAKFAST WITH A FULL GLASS OF WATER) 30 tablet 6    PRAVACHOL 10 MG tablet TAKE ONE TABLET BY MOUTH EVERY DAY 30 tablet 6    citalopram (CELEXA) 20 MG tablet Take 1 tablet by mouth daily      Glucosamine-Chondroitin 500-400 MG CAPS Take 1 capsule by mouth 2 times daily  (Patient not taking: Reported on 9/21/2023)      Multiple Vitamin (MULTIVITAMIN) capsule Take 1 capsule by mouth daily      Calcium Citrate-Vitamin D 200-200 MG-UNIT TABS Take 1 tablet by mouth 2 times daily. No current facility-administered medications on file prior to encounter. REVIEW OF SYSTEMS    Pertinent items are noted in HPI.       Last J5P if applicable:   Hemoglobin A1C   Date Value Ref Range Status   04/12/2023 5.8 See comment % Final     Comment:     Comment:  Diagnosis of Diabetes: > or = 6.5%  Increased risk of diabetes (Prediabetes): 5.7-6.4%  Glycemic Control: Nonpregnant Adults: <7.0%

## 2024-08-05 ENCOUNTER — APPOINTMENT (OUTPATIENT)
Dept: GENERAL RADIOLOGY | Age: 79
End: 2024-08-05
Payer: MEDICARE

## 2024-08-05 ENCOUNTER — APPOINTMENT (OUTPATIENT)
Dept: CT IMAGING | Age: 79
End: 2024-08-05
Payer: MEDICARE

## 2024-08-05 ENCOUNTER — HOSPITAL ENCOUNTER (EMERGENCY)
Age: 79
Discharge: HOME | End: 2024-08-05
Attending: EMERGENCY MEDICINE
Payer: MEDICARE

## 2024-08-05 ENCOUNTER — APPOINTMENT (OUTPATIENT)
Dept: MRI IMAGING | Age: 79
End: 2024-08-05
Payer: MEDICARE

## 2024-08-05 VITALS
DIASTOLIC BLOOD PRESSURE: 84 MMHG | BODY MASS INDEX: 29.57 KG/M2 | OXYGEN SATURATION: 95 % | HEIGHT: 67 IN | WEIGHT: 188.4 LBS | SYSTOLIC BLOOD PRESSURE: 162 MMHG | HEART RATE: 54 BPM | RESPIRATION RATE: 15 BRPM | TEMPERATURE: 98.2 F

## 2024-08-05 DIAGNOSIS — R20.0 FACIAL NUMBNESS: ICD-10-CM

## 2024-08-05 DIAGNOSIS — G51.0 BELL'S PALSY: Primary | ICD-10-CM

## 2024-08-05 DIAGNOSIS — R29.810 FACIAL DROOP: ICD-10-CM

## 2024-08-05 LAB
ANION GAP SERPL CALCULATED.3IONS-SCNC: 12 MMOL/L (ref 3–16)
BASOPHILS # BLD: 0.1 K/UL (ref 0–0.2)
BASOPHILS NFR BLD: 1 %
BUN SERPL-MCNC: 22 MG/DL (ref 7–20)
CALCIUM SERPL-MCNC: 9.3 MG/DL (ref 8.3–10.6)
CHLORIDE SERPL-SCNC: 101 MMOL/L (ref 99–110)
CO2 SERPL-SCNC: 27 MMOL/L (ref 21–32)
CREAT SERPL-MCNC: 0.9 MG/DL (ref 0.6–1.2)
DEPRECATED RDW RBC AUTO: 13.9 % (ref 12.4–15.4)
EKG ATRIAL RATE: 58 BPM
EKG DIAGNOSIS: NORMAL
EKG P AXIS: 56 DEGREES
EKG P-R INTERVAL: 170 MS
EKG Q-T INTERVAL: 444 MS
EKG QRS DURATION: 92 MS
EKG QTC CALCULATION (BAZETT): 435 MS
EKG R AXIS: 3 DEGREES
EKG T AXIS: 21 DEGREES
EKG VENTRICULAR RATE: 58 BPM
EOSINOPHIL # BLD: 0.3 K/UL (ref 0–0.6)
EOSINOPHIL NFR BLD: 4 %
GFR SERPLBLD CREATININE-BSD FMLA CKD-EPI: 66 ML/MIN/{1.73_M2}
GLUCOSE BLD-MCNC: 170 MG/DL (ref 70–99)
GLUCOSE SERPL-MCNC: 181 MG/DL (ref 70–99)
HCT VFR BLD AUTO: 42.2 % (ref 36–48)
HGB BLD-MCNC: 13.7 G/DL (ref 12–16)
LYMPHOCYTES # BLD: 1.6 K/UL (ref 1–5.1)
LYMPHOCYTES NFR BLD: 24.9 %
MAGNESIUM SERPL-MCNC: 2.1 MG/DL (ref 1.8–2.4)
MCH RBC QN AUTO: 27.7 PG (ref 26–34)
MCHC RBC AUTO-ENTMCNC: 32.4 G/DL (ref 31–36)
MCV RBC AUTO: 85.3 FL (ref 80–100)
MONOCYTES # BLD: 0.5 K/UL (ref 0–1.3)
MONOCYTES NFR BLD: 7.4 %
NEUTROPHILS # BLD: 4 K/UL (ref 1.7–7.7)
NEUTROPHILS NFR BLD: 62.7 %
PERFORMED ON: ABNORMAL
PLATELET # BLD AUTO: 124 K/UL (ref 135–450)
PMV BLD AUTO: 10.5 FL (ref 5–10.5)
POTASSIUM SERPL-SCNC: 3.6 MMOL/L (ref 3.5–5.1)
RBC # BLD AUTO: 4.94 M/UL (ref 4–5.2)
SODIUM SERPL-SCNC: 140 MMOL/L (ref 136–145)
TROPONIN, HIGH SENSITIVITY: 11 NG/L (ref 0–14)
WBC # BLD AUTO: 6.4 K/UL (ref 4–11)

## 2024-08-05 PROCEDURE — 96374 THER/PROPH/DIAG INJ IV PUSH: CPT

## 2024-08-05 PROCEDURE — 71045 X-RAY EXAM CHEST 1 VIEW: CPT

## 2024-08-05 PROCEDURE — 80048 BASIC METABOLIC PNL TOTAL CA: CPT

## 2024-08-05 PROCEDURE — 99285 EMERGENCY DEPT VISIT HI MDM: CPT

## 2024-08-05 PROCEDURE — 6360000002 HC RX W HCPCS: Performed by: EMERGENCY MEDICINE

## 2024-08-05 PROCEDURE — 70498 CT ANGIOGRAPHY NECK: CPT

## 2024-08-05 PROCEDURE — 93005 ELECTROCARDIOGRAM TRACING: CPT | Performed by: EMERGENCY MEDICINE

## 2024-08-05 PROCEDURE — 6370000000 HC RX 637 (ALT 250 FOR IP): Performed by: EMERGENCY MEDICINE

## 2024-08-05 PROCEDURE — 84484 ASSAY OF TROPONIN QUANT: CPT

## 2024-08-05 PROCEDURE — 70551 MRI BRAIN STEM W/O DYE: CPT

## 2024-08-05 PROCEDURE — 83735 ASSAY OF MAGNESIUM: CPT

## 2024-08-05 PROCEDURE — 70450 CT HEAD/BRAIN W/O DYE: CPT

## 2024-08-05 PROCEDURE — 85025 COMPLETE CBC W/AUTO DIFF WBC: CPT

## 2024-08-05 PROCEDURE — 6360000004 HC RX CONTRAST MEDICATION: Performed by: STUDENT IN AN ORGANIZED HEALTH CARE EDUCATION/TRAINING PROGRAM

## 2024-08-05 RX ORDER — VALACYCLOVIR HYDROCHLORIDE 1 G/1
1000 TABLET, FILM COATED ORAL 3 TIMES DAILY
Qty: 21 TABLET | Refills: 0 | Status: SHIPPED | OUTPATIENT
Start: 2024-08-05 | End: 2024-08-12

## 2024-08-05 RX ORDER — PREDNISONE 20 MG/1
40 TABLET ORAL DAILY
Qty: 10 TABLET | Refills: 0 | Status: SHIPPED | OUTPATIENT
Start: 2024-08-05 | End: 2024-08-10

## 2024-08-05 RX ORDER — ONDANSETRON 2 MG/ML
4 INJECTION INTRAMUSCULAR; INTRAVENOUS ONCE
Status: COMPLETED | OUTPATIENT
Start: 2024-08-05 | End: 2024-08-05

## 2024-08-05 RX ORDER — ONDANSETRON 4 MG/1
4 TABLET, ORALLY DISINTEGRATING ORAL 3 TIMES DAILY PRN
Qty: 21 TABLET | Refills: 0 | Status: SHIPPED | OUTPATIENT
Start: 2024-08-05

## 2024-08-05 RX ORDER — VALACYCLOVIR HYDROCHLORIDE 1 G/1
1000 TABLET, FILM COATED ORAL ONCE
Status: COMPLETED | OUTPATIENT
Start: 2024-08-05 | End: 2024-08-05

## 2024-08-05 RX ORDER — ERYTHROMYCIN 5 MG/G
OINTMENT OPHTHALMIC
Qty: 3.5 G | Refills: 0 | Status: SHIPPED | OUTPATIENT
Start: 2024-08-05 | End: 2024-08-15

## 2024-08-05 RX ORDER — PREDNISONE 20 MG/1
60 TABLET ORAL ONCE
Status: COMPLETED | OUTPATIENT
Start: 2024-08-05 | End: 2024-08-05

## 2024-08-05 RX ADMIN — VALACYCLOVIR HYDROCHLORIDE 1000 MG: 1 TABLET, FILM COATED ORAL at 23:20

## 2024-08-05 RX ADMIN — ONDANSETRON 4 MG: 2 INJECTION INTRAMUSCULAR; INTRAVENOUS at 22:49

## 2024-08-05 RX ADMIN — IOPAMIDOL 75 ML: 755 INJECTION, SOLUTION INTRAVENOUS at 15:50

## 2024-08-05 RX ADMIN — PREDNISONE 60 MG: 20 TABLET ORAL at 22:51

## 2024-08-05 ASSESSMENT — LIFESTYLE VARIABLES
HOW MANY STANDARD DRINKS CONTAINING ALCOHOL DO YOU HAVE ON A TYPICAL DAY: PATIENT DOES NOT DRINK
HOW OFTEN DO YOU HAVE A DRINK CONTAINING ALCOHOL: NEVER

## 2024-08-05 ASSESSMENT — ENCOUNTER SYMPTOMS
SHORTNESS OF BREATH: 0
NAUSEA: 0
PHOTOPHOBIA: 0
VOMITING: 0

## 2024-08-05 ASSESSMENT — PAIN - FUNCTIONAL ASSESSMENT: PAIN_FUNCTIONAL_ASSESSMENT: NONE - DENIES PAIN

## 2024-08-05 NOTE — ED NOTES
MRI tech states he will be ready to get pt in around 45 min. Provider notified.      Jovanni Mulligan RN  08/05/24 [id number]

## 2024-08-05 NOTE — ED NOTES
Pt /76. Pt denies HA, CP, dizziness. Pt states she took her BP medications this morning. Daughters at bedside unsure what BP meds pt is on. Provider notified of BP. No new orders at this time.      Jovanni Mulligan RN  08/05/24 2992

## 2024-08-05 NOTE — ED NOTES
Pt's daughter and POA at bedside, Marcela, able to assist with completion of MRI questionnaire. This RN notified MRI that screening form has been completed and pt is ready to go.      Jovanni Mulligan RN  08/05/24 8210

## 2024-08-06 NOTE — DISCHARGE INSTRUCTIONS
Take medications. Keep eye lubricated to prevent issues. Take steroids and anti-viral medications for the next week.

## 2024-08-06 NOTE — ED PROVIDER NOTES
THE [facility]  EMERGENCY DEPARTMENT ENCOUNTER          ATTENDING PHYSICIAN NOTE       Date of evaluation: 8/5/2024    ADDENDUM:      Care of this patient was assumed from Dr Casarez.  The patient was seen for Facial Droop (Patient arrived via EMS with complaints to facial numbness and droop of the left side x 1 week)  .  The patient's initial evaluation and plan have been discussed with the prior provider who initially evaluated the patient.  Nursing Notes, Past Medical Hx, Past Surgical Hx, Social Hx, Allergies, and Family Hx were all reviewed.    ASSESSMENT / PLAN  (MEDICAL DECISION MAKING)     Hui Roger is a 79 y.o. female who presents with a chief complaint of facial droop.  Patient with left-sided facial droop.  I was asked to follow-up on MRI.  Prior provider felt this was more likely Bell's palsy but did obtain MRI given the patient's age.  MRI with chronic small vessel disease but no acute stroke.  On my assessment patient does have weakness of upper and lower facial muscles consistent with full cranial nerve VII palsy rather than central etiology of facial droop.  I do agree this is likely Bell's palsy.    Patient will be prescribed steroids, Valtrex for Bell's palsy, in addition to eyedrops and eye ointment given the fact that she is having difficulty closing her left eye.  Has a primary care to follow-up with, also given ENT referral to follow-up.    Is this patient to be included in the SEP-1 core measure? No Exclusion criteria - the patient is NOT to be included for SEP-1 Core Measure due to: Infection is not suspected    Medical Decision Making  Problems Addressed:  Bell's palsy: acute illness or injury  Facial droop: acute illness or injury  Facial numbness: acute illness or injury    Amount and/or Complexity of Data Reviewed  Independent Historian: caregiver     Details: Spoke with daughters  External Data Reviewed: notes.     Details: Reviewed prior chart including outpt notes and prior 
Negative for neck pain and neck stiffness.   Neurological:  Negative for dizziness and headaches.       Past Medical, Surgical, Family, and Social History     She has a past medical history of Carpal tunnel syndrome, Chronic gastritis, DVT (deep venous thrombosis) (HCC), Hearing impairment, History of degenerative disc disease, HLD (hyperlipidemia), Hypothyroid, Peripheral neuropathy, and Pulmonary embolism (HCC).  She has a past surgical history that includes joint replacement.  Her family history is not on file.  She reports that she has never smoked. She has never used smokeless tobacco. She reports that she does not drink alcohol and does not use drugs.    Medications     Previous Medications    CALCIUM CITRATE-VITAMIN D 200-200 MG-UNIT TABS    Take 1 tablet by mouth 2 times daily.    CITALOPRAM (CELEXA) 20 MG TABLET    Take 1 tablet by mouth daily    DICLOFENAC SODIUM (VOLTAREN) 1 % GEL    Apply 2 g topically 4 times daily Apply to affected joint.    GABAPENTIN (NEURONTIN) 300 MG CAPSULE    Take 300 mg by mouth 3 times daily.     GLUCOSAMINE-CHONDROITIN 500-400 MG CAPS    Take 1 capsule by mouth 2 times daily     HYDROCHLOROTHIAZIDE (MICROZIDE) 12.5 MG CAPSULE    Take 12.5 mg by mouth every morning    LEVOTHYROXINE (SYNTHROID) 50 MCG TABLET    TAKE ONE TABLET BY MOUTH ONE-HALF HOUR BEFORE BREAKFAST WITH A FULL GLASS OF WATER    LIDOCAINE (LIDODERM) 5 %    Place 1 patch onto the skin daily 12 hours on, 12 hours off.    MULTIPLE VITAMIN (MULTIVITAMIN) CAPSULE    Take 1 capsule by mouth daily    PRAVACHOL 10 MG TABLET    TAKE ONE TABLET BY MOUTH EVERY DAY    RIVAROXABAN (XARELTO) 20 MG TABS TABLET    Take 1 tablet by mouth       Allergies     She is allergic to penicillins, cephalexin, and heparin.    Physical Exam     INITIAL VITALS: BP: (!) 129/91, Temp: 98.2 °F (36.8 °C), Pulse: 74, Respirations: 18, SpO2: 92 %   Physical Exam  Constitutional:       General: She is not in acute distress.     Appearance: Normal

## 2024-10-05 ENCOUNTER — HOSPITAL ENCOUNTER (EMERGENCY)
Age: 79
Discharge: HOME | End: 2024-10-05
Attending: EMERGENCY MEDICINE
Payer: MEDICARE

## 2024-10-05 ENCOUNTER — APPOINTMENT (OUTPATIENT)
Dept: GENERAL RADIOLOGY | Age: 79
End: 2024-10-05
Payer: MEDICARE

## 2024-10-05 VITALS
BODY MASS INDEX: 28.51 KG/M2 | SYSTOLIC BLOOD PRESSURE: 168 MMHG | WEIGHT: 182 LBS | OXYGEN SATURATION: 99 % | DIASTOLIC BLOOD PRESSURE: 78 MMHG | RESPIRATION RATE: 17 BRPM | HEART RATE: 64 BPM | TEMPERATURE: 98 F

## 2024-10-05 DIAGNOSIS — M54.2 NECK PAIN: Primary | ICD-10-CM

## 2024-10-05 LAB
ANION GAP SERPL CALCULATED.3IONS-SCNC: 11 MMOL/L (ref 3–16)
BASOPHILS # BLD: 0 K/UL (ref 0–0.2)
BASOPHILS NFR BLD: 0.5 %
BUN SERPL-MCNC: 24 MG/DL (ref 7–20)
CALCIUM SERPL-MCNC: 9.5 MG/DL (ref 8.3–10.6)
CHLORIDE SERPL-SCNC: 103 MMOL/L (ref 99–110)
CO2 SERPL-SCNC: 27 MMOL/L (ref 21–32)
CREAT SERPL-MCNC: 0.9 MG/DL (ref 0.6–1.2)
DEPRECATED RDW RBC AUTO: 14.3 % (ref 12.4–15.4)
EOSINOPHIL # BLD: 0.2 K/UL (ref 0–0.6)
EOSINOPHIL NFR BLD: 2.5 %
GFR SERPLBLD CREATININE-BSD FMLA CKD-EPI: 66 ML/MIN/{1.73_M2}
GLUCOSE SERPL-MCNC: 107 MG/DL (ref 70–99)
HCT VFR BLD AUTO: 41.3 % (ref 36–48)
HGB BLD-MCNC: 13.7 G/DL (ref 12–16)
LYMPHOCYTES # BLD: 1.8 K/UL (ref 1–5.1)
LYMPHOCYTES NFR BLD: 21.5 %
MCH RBC QN AUTO: 28.5 PG (ref 26–34)
MCHC RBC AUTO-ENTMCNC: 33.1 G/DL (ref 31–36)
MCV RBC AUTO: 86 FL (ref 80–100)
MONOCYTES # BLD: 0.7 K/UL (ref 0–1.3)
MONOCYTES NFR BLD: 7.9 %
NEUTROPHILS # BLD: 5.7 K/UL (ref 1.7–7.7)
NEUTROPHILS NFR BLD: 67.6 %
PLATELET # BLD AUTO: 122 K/UL (ref 135–450)
PMV BLD AUTO: 10.8 FL (ref 5–10.5)
POTASSIUM SERPL-SCNC: 4.1 MMOL/L (ref 3.5–5.1)
RBC # BLD AUTO: 4.8 M/UL (ref 4–5.2)
SODIUM SERPL-SCNC: 141 MMOL/L (ref 136–145)
TROPONIN, HIGH SENSITIVITY: 11 NG/L (ref 0–14)
TROPONIN, HIGH SENSITIVITY: 13 NG/L (ref 0–14)
WBC # BLD AUTO: 8.4 K/UL (ref 4–11)

## 2024-10-05 PROCEDURE — 99285 EMERGENCY DEPT VISIT HI MDM: CPT

## 2024-10-05 PROCEDURE — 6370000000 HC RX 637 (ALT 250 FOR IP)

## 2024-10-05 PROCEDURE — 71046 X-RAY EXAM CHEST 2 VIEWS: CPT

## 2024-10-05 PROCEDURE — 85025 COMPLETE CBC W/AUTO DIFF WBC: CPT

## 2024-10-05 PROCEDURE — 93005 ELECTROCARDIOGRAM TRACING: CPT

## 2024-10-05 PROCEDURE — 84484 ASSAY OF TROPONIN QUANT: CPT

## 2024-10-05 PROCEDURE — 36415 COLL VENOUS BLD VENIPUNCTURE: CPT

## 2024-10-05 PROCEDURE — 80048 BASIC METABOLIC PNL TOTAL CA: CPT

## 2024-10-05 RX ORDER — METHOCARBAMOL 500 MG/1
750 TABLET, FILM COATED ORAL ONCE
Status: COMPLETED | OUTPATIENT
Start: 2024-10-05 | End: 2024-10-05

## 2024-10-05 RX ORDER — METHOCARBAMOL 500 MG/1
500 TABLET, FILM COATED ORAL 4 TIMES DAILY PRN
Qty: 20 TABLET | Refills: 0 | Status: SHIPPED | OUTPATIENT
Start: 2024-10-05 | End: 2024-10-10

## 2024-10-05 RX ORDER — ACETAMINOPHEN 500 MG
1000 TABLET ORAL ONCE
Status: COMPLETED | OUTPATIENT
Start: 2024-10-05 | End: 2024-10-05

## 2024-10-05 RX ADMIN — IBUPROFEN 600 MG: 200 TABLET, FILM COATED ORAL at 12:42

## 2024-10-05 RX ADMIN — METHOCARBAMOL 750 MG: 500 TABLET ORAL at 12:42

## 2024-10-05 RX ADMIN — ACETAMINOPHEN 1000 MG: 500 TABLET ORAL at 12:41

## 2024-10-05 ASSESSMENT — PAIN DESCRIPTION - ORIENTATION
ORIENTATION: LEFT

## 2024-10-05 ASSESSMENT — PAIN SCALES - GENERAL
PAINLEVEL_OUTOF10: 5
PAINLEVEL_OUTOF10: 5
PAINLEVEL_OUTOF10: 3
PAINLEVEL_OUTOF10: 4

## 2024-10-05 ASSESSMENT — PAIN DESCRIPTION - LOCATION
LOCATION: EAR;NECK
LOCATION: NECK
LOCATION: NECK

## 2024-10-05 NOTE — ED PROVIDER NOTES
THE [facility]  EMERGENCY DEPARTMENT ENCOUNTER          EM RESIDENT NOTE       Date of evaluation: 10/5/2024    Chief Complaint     Neck Pain (Neck pain after sleeping)    History of Present Illness     Hui Roger is a 79 y.o. female with PMH of PE/DVT on Xarelto, hypothyroidism, HLD who presents with shoulder pain.    Patient presents with left shoulder pain upon awakening this morning, described as a pulsatile throbbing pain.  Pain increases with range of motion and palpation.  Patient had similar pain 2 months ago that resolved spontaneously without treatment.  Denies any sensation changes in the distal extremity.  History of DVT on Xarelto.  Reports recent ED visit with diagnosis of Bell's palsy; symptoms resolved.    The patient denies any other aggravating or alleviating factors unless otherwise explicitly stated above.    MEDICAL DECISION MAKING / ASSESSMENT / PLAN     INITIAL VITALS: BP: 139/70,  , Pulse: 67, Respirations: 18, SpO2: 92 %    Hui Roger is a 79 y.o. female with a history and presentation as described above in HPI. This patient was also evaluated by the attending physician. All care plans were discussed and agreed upon.    ED Course as of 10/05/24 1837   Sat Oct 05, 2024   1305 Patient is normotensive, afebrile, nontachycardic, nontachypneic on room air at this time.  Patient presents with left neck/shoulder pain that began upon awakening this morning; neurovascularly intact.  Based off of description of symptoms and physical exam, most consistent with musculoskeletal injury; however, plan to obtain screening labs to evaluate for ACS/dissection.  X-ray obtained to evaluate for osseous pathology.  No infectious concerns.  Pain control with Tylenol, ibuprofen, and Robaxin. [LB]   1310 XR CHEST (2 VW)  No acute cardiopulmonary abnormality. [LB]   1402 WBC: 8.4  No leukocytosis; no concern for acute systemic infection or stress response. [LB]   1426 Troponin, High Sensitivity:  MG disintegrating tablet Take 1 tablet by mouth 3 times daily as needed for Nausea or Vomiting  Qty: 21 tablet, Refills: 0      rivaroxaban (XARELTO) 20 MG TABS tablet Take 1 tablet by mouth      diclofenac sodium (VOLTAREN) 1 % GEL Apply 2 g topically 4 times daily Apply to affected joint.  Qty: 150 g, Refills: 0      lidocaine (LIDODERM) 5 % Place 1 patch onto the skin daily 12 hours on, 12 hours off.  Qty: 30 patch, Refills: 0      gabapentin (NEURONTIN) 300 MG capsule Take 300 mg by mouth 3 times daily.       hydrochlorothiazide (MICROZIDE) 12.5 MG capsule Take 12.5 mg by mouth every morning      levothyroxine (SYNTHROID) 50 MCG tablet TAKE ONE TABLET BY MOUTH ONE-HALF HOUR BEFORE BREAKFAST WITH A FULL GLASS OF WATER  Qty: 30 tablet, Refills: 6      PRAVACHOL 10 MG tablet TAKE ONE TABLET BY MOUTH EVERY DAY  Qty: 30 tablet, Refills: 6      citalopram (CELEXA) 20 MG tablet Take 1 tablet by mouth daily      Glucosamine-Chondroitin 500-400 MG CAPS Take 1 capsule by mouth 2 times daily       Multiple Vitamin (MULTIVITAMIN) capsule Take 1 capsule by mouth daily      Calcium Citrate-Vitamin D 200-200 MG-UNIT TABS Take 1 tablet by mouth 2 times daily.           Allergies     She is allergic to penicillins, cephalexin, and heparin.    Physical Exam     INITIAL VITALS: BP: 139/70,  , Pulse: 67, Respirations: 18, SpO2: 92 %     General:  Well-appearing. Resting comfortably.   Eyes:  Pupils reactive. EOMI. No discharge from eyes.  ENT: No oropharyngeal edema, tolerating oral secretions.  Pulmonary:   Non-labored breathing. Breath sounds clear bilaterally.  Cardiac:  Regular rate and rhythm.  Abdomen:  Soft. Non-distended. Non-tender.  Musculoskeletal:  No long bone deformity. No peripheral edema.  TTP to left anterior shoulder, with pain increasing on shoulder flexion, external rotation, and abduction.  Skin:  Dry, no rashes.  Neuro:  Alert and oriented x4. Moves all four extremities to command. CN II-XII intact.     Crys

## 2024-10-05 NOTE — ED TRIAGE NOTES
Patient complains of left side neck and shoulder pain that started this morning.  Patient is deaf.  Daughter at bedside.  Patient able to move all extremities.  Equal strength in hand .

## 2024-10-05 NOTE — ED PROVIDER NOTES
ED Attending Attestation Note     Date of evaluation: 10/5/2024    This patient was seen by the resident.  I have seen and examined the patient, agree with the workup, evaluation, management and diagnosis. The care plan has been discussed.  I have reviewed the ECG and concur with the resident's interpretation.  My assessment reveals adult female who presents with pain at the base of the left neck, mostly localized along the left trapezius ridge and somewhat just anterior to it, that began this morning.  She has some exacerbation of pain with elevation of the left shoulder, some focal tenderness in that area.  Lower suspicion would be cardiac concern, but given age and possibility of atypical ACS we will go ahead and check EKG and troponin as well as some basic labs and obtain chest x-ray..       Hunter Acharya MD  10/05/24 0571

## 2024-10-05 NOTE — ED NOTES
Patient discharged to home via family.  Written discharge instructions reviewed with understanding.  Copy of AVS and signed prescription sent home with patient. Patient able to walk from ED without assistance.       Kyleigh Escobar RN  10/05/24 5331

## 2024-10-05 NOTE — DISCHARGE INSTRUCTIONS
You were seen in the emergency department for   Chief Complaint   Patient presents with    Neck Pain     Neck pain after sleeping   .      I recommend that you follow up with your primary care physician in the next week for further evaluation and management.    I have prescribed you the following:  Current Discharge Medication List        START taking these medications    Details   methocarbamol (ROBAXIN) 500 MG tablet Take 1 tablet by mouth 4 times daily as needed (Spasm)  Qty: 20 tablet, Refills: 0           Please take these medications as prescribed.    You may take 500 - 650 mg of acetaminophen (Tylenol) every 4-6 hours and/or 400 mg ibuprofen (Motrin) every 4-6 hours for pain. Do not exceed more than 3 grams of acetaminophen each day.    For the best results, alternate the acetaminophen and ibuprofen, so that you are taking a pill every 3-4 hours.    For example:  Acetaminophen 500 mg  4 hours later  Ibuprofen 400 mg  4 hours later  Acetaminophen 500 mg    Please discuss symptoms with your physical therapist to see if there are additional exercises you can do to help.    Please return to the emergency department if you develop worsening symptoms, including fever, headache, abdominal pain, nausea, vomiting, diarrhea, chest pain or chest tightness, feel as if you are going to pass out, or have any other new or concerning symptoms.     No future appointments.

## 2024-10-06 LAB
EKG ATRIAL RATE: 51 BPM
EKG DIAGNOSIS: NORMAL
EKG P AXIS: 51 DEGREES
EKG P-R INTERVAL: 206 MS
EKG Q-T INTERVAL: 450 MS
EKG QRS DURATION: 78 MS
EKG QTC CALCULATION (BAZETT): 414 MS
EKG R AXIS: 15 DEGREES
EKG T AXIS: 38 DEGREES
EKG VENTRICULAR RATE: 51 BPM

## 2025-07-16 ENCOUNTER — TRANSCRIBE ORDERS (OUTPATIENT)
Dept: ADMINISTRATIVE | Age: 80
End: 2025-07-16

## 2025-07-16 DIAGNOSIS — M54.16 LUMBAR RADICULOPATHY: Primary | ICD-10-CM

## 2025-07-17 ENCOUNTER — HOSPITAL ENCOUNTER (OUTPATIENT)
Dept: MRI IMAGING | Age: 80
Discharge: HOME | End: 2025-07-17
Attending: PAIN MEDICINE
Payer: MEDICARE

## 2025-07-17 DIAGNOSIS — M54.16 LUMBAR RADICULOPATHY: ICD-10-CM

## 2025-07-17 PROCEDURE — 72148 MRI LUMBAR SPINE W/O DYE: CPT
